# Patient Record
Sex: MALE | Race: WHITE | Employment: OTHER | URBAN - METROPOLITAN AREA
[De-identification: names, ages, dates, MRNs, and addresses within clinical notes are randomized per-mention and may not be internally consistent; named-entity substitution may affect disease eponyms.]

---

## 2019-01-04 ENCOUNTER — HOSPITAL ENCOUNTER (OUTPATIENT)
Dept: PREADMISSION TESTING | Age: 74
Discharge: HOME | End: 2019-01-04
Payer: MEDICARE

## 2019-01-04 LAB
ALBUMIN SERPL-MCNC: 3.7 G/DL (ref 3.4–5)
ALBUMIN/GLOB SERPL: 1.1 {RATIO} (ref 0.8–1.7)
ALP SERPL-CCNC: 68 U/L (ref 45–117)
ALT SERPL-CCNC: 67 U/L (ref 16–61)
ANION GAP SERPL CALC-SCNC: 5 MMOL/L (ref 3–18)
AST SERPL-CCNC: 43 U/L (ref 15–37)
BILIRUB SERPL-MCNC: 0.9 MG/DL (ref 0.2–1)
BUN SERPL-MCNC: 20 MG/DL (ref 7–18)
BUN/CREAT SERPL: 21
CALCIUM SERPL-MCNC: 9.2 MG/DL (ref 8.5–10.1)
CHLORIDE SERPL-SCNC: 102 MMOL/L (ref 100–108)
CO2 SERPL-SCNC: 30 MMOL/L (ref 21–32)
CREAT SERPL-MCNC: 0.95 MG/DL (ref 0.6–1.3)
GLOBULIN SER CALC-MCNC: 3.3 G/DL (ref 2–4)
GLUCOSE SERPL-MCNC: 100 MG/DL (ref 74–99)
POTASSIUM SERPL-SCNC: 5 MMOL/L (ref 3.5–5.5)
PROT SERPL-MCNC: 7 G/DL (ref 6.4–8.2)
SODIUM SERPL-SCNC: 137 MMOL/L (ref 136–145)

## 2019-01-04 PROCEDURE — 86900 BLOOD TYPING SEROLOGIC ABO: CPT

## 2019-01-04 PROCEDURE — 36415 COLL VENOUS BLD VENIPUNCTURE: CPT

## 2019-01-04 PROCEDURE — 80053 COMPREHEN METABOLIC PANEL: CPT

## 2019-01-05 LAB
ABO + RH BLD: NORMAL
BLOOD GROUP ANTIBODIES SERPL: NORMAL
SPECIMEN EXP DATE BLD: NORMAL

## 2019-01-11 ENCOUNTER — HOSPITAL ENCOUNTER (OUTPATIENT)
Age: 74
Discharge: HOME | End: 2019-01-11
Attending: ORTHOPAEDIC SURGERY | Admitting: ORTHOPAEDIC SURGERY
Payer: MEDICARE

## 2019-01-11 VITALS
WEIGHT: 197.06 LBS | TEMPERATURE: 98 F | HEART RATE: 63 BPM | DIASTOLIC BLOOD PRESSURE: 112 MMHG | OXYGEN SATURATION: 100 % | HEIGHT: 68 IN | RESPIRATION RATE: 16 BRPM | BODY MASS INDEX: 29.86 KG/M2 | SYSTOLIC BLOOD PRESSURE: 167 MMHG

## 2019-01-11 PROBLEM — Z96.641 STATUS POST TOTAL REPLACEMENT OF RIGHT HIP: Status: ACTIVE | Noted: 2019-01-11

## 2019-01-11 PROCEDURE — 74011250636 HC RX REV CODE- 250/636: Performed by: ORTHOPAEDIC SURGERY

## 2019-01-11 PROCEDURE — 74011250636 HC RX REV CODE- 250/636: Performed by: SPECIALIST

## 2019-01-11 RX ORDER — MAGNESIUM SULFATE 100 %
4 CRYSTALS MISCELLANEOUS AS NEEDED
Status: CANCELLED | OUTPATIENT
Start: 2019-01-11

## 2019-01-11 RX ORDER — DEXAMETHASONE SODIUM PHOSPHATE 4 MG/ML
8 INJECTION, SOLUTION INTRA-ARTICULAR; INTRALESIONAL; INTRAMUSCULAR; INTRAVENOUS; SOFT TISSUE ONCE
Status: DISCONTINUED | OUTPATIENT
Start: 2019-01-11 | End: 2019-01-11 | Stop reason: HOSPADM

## 2019-01-11 RX ORDER — HYDROMORPHONE HYDROCHLORIDE 2 MG/ML
0.5 INJECTION, SOLUTION INTRAMUSCULAR; INTRAVENOUS; SUBCUTANEOUS
Status: CANCELLED | OUTPATIENT
Start: 2019-01-11

## 2019-01-11 RX ORDER — SODIUM CHLORIDE, SODIUM LACTATE, POTASSIUM CHLORIDE, CALCIUM CHLORIDE 600; 310; 30; 20 MG/100ML; MG/100ML; MG/100ML; MG/100ML
125 INJECTION, SOLUTION INTRAVENOUS CONTINUOUS
Status: DISCONTINUED | OUTPATIENT
Start: 2019-01-11 | End: 2019-01-11 | Stop reason: HOSPADM

## 2019-01-11 RX ORDER — HYDROMORPHONE HYDROCHLORIDE 2 MG/ML
0.2 INJECTION, SOLUTION INTRAMUSCULAR; INTRAVENOUS; SUBCUTANEOUS
Status: CANCELLED | OUTPATIENT
Start: 2019-01-11

## 2019-01-11 RX ORDER — SODIUM CHLORIDE 0.9 % (FLUSH) 0.9 %
5-40 SYRINGE (ML) INJECTION EVERY 8 HOURS
Status: CANCELLED | OUTPATIENT
Start: 2019-01-11

## 2019-01-11 RX ORDER — NALOXONE HYDROCHLORIDE 0.4 MG/ML
0.1 INJECTION, SOLUTION INTRAMUSCULAR; INTRAVENOUS; SUBCUTANEOUS AS NEEDED
Status: CANCELLED | OUTPATIENT
Start: 2019-01-11

## 2019-01-11 RX ORDER — MORPHINE SULFATE 4 MG/ML
2 INJECTION, SOLUTION INTRAMUSCULAR; INTRAVENOUS
Status: CANCELLED | OUTPATIENT
Start: 2019-01-11

## 2019-01-11 RX ORDER — IBUPROFEN 200 MG
800 TABLET ORAL
COMMUNITY
End: 2019-01-27

## 2019-01-11 RX ORDER — FENTANYL CITRATE 50 UG/ML
50 INJECTION, SOLUTION INTRAMUSCULAR; INTRAVENOUS
Status: CANCELLED | OUTPATIENT
Start: 2019-01-11

## 2019-01-11 RX ORDER — SODIUM CHLORIDE 9 MG/ML
125 INJECTION, SOLUTION INTRAVENOUS CONTINUOUS
Status: CANCELLED | OUTPATIENT
Start: 2019-01-11

## 2019-01-11 RX ORDER — ONDANSETRON 2 MG/ML
4 INJECTION INTRAMUSCULAR; INTRAVENOUS ONCE
Status: CANCELLED | OUTPATIENT
Start: 2019-01-11 | End: 2019-01-11

## 2019-01-11 RX ORDER — CELECOXIB 100 MG/1
200 CAPSULE ORAL ONCE
Status: DISCONTINUED | OUTPATIENT
Start: 2019-01-11 | End: 2019-01-11 | Stop reason: HOSPADM

## 2019-01-11 RX ORDER — DEXTROSE 50 % IN WATER (D50W) INTRAVENOUS SYRINGE
25-50 AS NEEDED
Status: CANCELLED | OUTPATIENT
Start: 2019-01-11

## 2019-01-11 RX ORDER — FENTANYL CITRATE 50 UG/ML
25 INJECTION, SOLUTION INTRAMUSCULAR; INTRAVENOUS AS NEEDED
Status: CANCELLED | OUTPATIENT
Start: 2019-01-11

## 2019-01-11 RX ORDER — CEFAZOLIN SODIUM 2 G/50ML
2 SOLUTION INTRAVENOUS ONCE
Status: DISCONTINUED | OUTPATIENT
Start: 2019-01-11 | End: 2019-01-11 | Stop reason: HOSPADM

## 2019-01-11 RX ORDER — PREGABALIN 25 MG/1
25 CAPSULE ORAL ONCE
Status: DISCONTINUED | OUTPATIENT
Start: 2019-01-11 | End: 2019-01-11 | Stop reason: HOSPADM

## 2019-01-11 RX ORDER — SODIUM CHLORIDE 0.9 % (FLUSH) 0.9 %
5-40 SYRINGE (ML) INJECTION AS NEEDED
Status: CANCELLED | OUTPATIENT
Start: 2019-01-11

## 2019-01-11 RX ORDER — ACETAMINOPHEN 500 MG
1000 TABLET ORAL ONCE
Status: DISCONTINUED | OUTPATIENT
Start: 2019-01-11 | End: 2019-01-11 | Stop reason: HOSPADM

## 2019-01-11 RX ORDER — MORPHINE SULFATE 4 MG/ML
1 INJECTION, SOLUTION INTRAMUSCULAR; INTRAVENOUS
Status: CANCELLED | OUTPATIENT
Start: 2019-01-11

## 2019-01-11 RX ADMIN — SODIUM CHLORIDE, SODIUM LACTATE, POTASSIUM CHLORIDE, AND CALCIUM CHLORIDE 125 ML/HR: 600; 310; 30; 20 INJECTION, SOLUTION INTRAVENOUS at 06:34

## 2019-01-11 RX ADMIN — SODIUM CHLORIDE, SODIUM LACTATE, POTASSIUM CHLORIDE, AND CALCIUM CHLORIDE 1000 ML: 600; 310; 30; 20 INJECTION, SOLUTION INTRAVENOUS at 06:34

## 2019-01-11 NOTE — PERIOP NOTES
Dr. Royal Navas and Dr. Lidia Mahan made aware of last dose of Valsartan. Procedure cancelled d/t pt. Not taking BP meds for 5 days.

## 2019-01-16 NOTE — DISCHARGE SUMMARY
Case cancelled due to pt's elevated BP and pt noncompliance with medication instructions. He stopped his BP meds 1 week pre-op. Pt not admitted. Surgery will be rescheduled.

## 2019-01-24 ENCOUNTER — ANESTHESIA EVENT (OUTPATIENT)
Dept: SURGERY | Age: 74
DRG: 470 | End: 2019-01-24
Payer: MEDICARE

## 2019-01-24 RX ORDER — SODIUM CHLORIDE 0.9 % (FLUSH) 0.9 %
5-40 SYRINGE (ML) INJECTION EVERY 8 HOURS
Status: CANCELLED | OUTPATIENT
Start: 2019-01-25

## 2019-01-24 RX ORDER — SODIUM CHLORIDE 0.9 % (FLUSH) 0.9 %
5-40 SYRINGE (ML) INJECTION AS NEEDED
Status: CANCELLED | OUTPATIENT
Start: 2019-01-24

## 2019-01-25 ENCOUNTER — APPOINTMENT (OUTPATIENT)
Dept: GENERAL RADIOLOGY | Age: 74
DRG: 470 | End: 2019-01-25
Attending: ORTHOPAEDIC SURGERY
Payer: MEDICARE

## 2019-01-25 ENCOUNTER — HOSPITAL ENCOUNTER (INPATIENT)
Age: 74
LOS: 2 days | Discharge: HOME HEALTH | DRG: 470 | End: 2019-01-27
Attending: ORTHOPAEDIC SURGERY | Admitting: ORTHOPAEDIC SURGERY
Payer: MEDICARE

## 2019-01-25 ENCOUNTER — ANESTHESIA (OUTPATIENT)
Dept: SURGERY | Age: 74
DRG: 470 | End: 2019-01-25
Payer: MEDICARE

## 2019-01-25 DIAGNOSIS — Z96.641 STATUS POST TOTAL REPLACEMENT OF RIGHT HIP: Primary | ICD-10-CM

## 2019-01-25 PROCEDURE — 77030011256 HC DRSG MEPILEX <16IN NO BORD MOLN -A: Performed by: ORTHOPAEDIC SURGERY

## 2019-01-25 PROCEDURE — 74011000258 HC RX REV CODE- 258: Performed by: ORTHOPAEDIC SURGERY

## 2019-01-25 PROCEDURE — 0SR903A REPLACEMENT OF RIGHT HIP JOINT WITH CERAMIC SYNTHETIC SUBSTITUTE, UNCEMENTED, OPEN APPROACH: ICD-10-PCS | Performed by: ORTHOPAEDIC SURGERY

## 2019-01-25 PROCEDURE — 77030014144 HC TY SPN ANES BBMI -B: Performed by: ANESTHESIOLOGY

## 2019-01-25 PROCEDURE — 77030012890

## 2019-01-25 PROCEDURE — 76210000017 HC OR PH I REC 1.5 TO 2 HR: Performed by: ORTHOPAEDIC SURGERY

## 2019-01-25 PROCEDURE — 77030037875 HC DRSG MEPILEX <16IN BORD MOLN -A: Performed by: ORTHOPAEDIC SURGERY

## 2019-01-25 PROCEDURE — 77030032490 HC SLV COMPR SCD KNE COVD -B: Performed by: ORTHOPAEDIC SURGERY

## 2019-01-25 PROCEDURE — 77030020262 HC SOL INJ SOD CL 0.9% 100ML: Performed by: ORTHOPAEDIC SURGERY

## 2019-01-25 PROCEDURE — 65270000029 HC RM PRIVATE

## 2019-01-25 PROCEDURE — 74011250636 HC RX REV CODE- 250/636

## 2019-01-25 PROCEDURE — 77030012406 HC DRN WND PENRS BARD -A: Performed by: ORTHOPAEDIC SURGERY

## 2019-01-25 PROCEDURE — 73501 X-RAY EXAM HIP UNI 1 VIEW: CPT

## 2019-01-25 PROCEDURE — C1776 JOINT DEVICE (IMPLANTABLE): HCPCS | Performed by: ORTHOPAEDIC SURGERY

## 2019-01-25 PROCEDURE — 77030018836 HC SOL IRR NACL ICUM -A: Performed by: ORTHOPAEDIC SURGERY

## 2019-01-25 PROCEDURE — 74011000250 HC RX REV CODE- 250: Performed by: ORTHOPAEDIC SURGERY

## 2019-01-25 PROCEDURE — 76010000131 HC OR TIME 2 TO 2.5 HR: Performed by: ORTHOPAEDIC SURGERY

## 2019-01-25 PROCEDURE — 77030031139 HC SUT VCRL2 J&J -A: Performed by: ORTHOPAEDIC SURGERY

## 2019-01-25 PROCEDURE — 74011250637 HC RX REV CODE- 250/637: Performed by: ORTHOPAEDIC SURGERY

## 2019-01-25 PROCEDURE — 77030038010: Performed by: ORTHOPAEDIC SURGERY

## 2019-01-25 PROCEDURE — 77030022295 HC SEAL BPLR VSL DISP MEDT -F: Performed by: ORTHOPAEDIC SURGERY

## 2019-01-25 PROCEDURE — 74011250636 HC RX REV CODE- 250/636: Performed by: ORTHOPAEDIC SURGERY

## 2019-01-25 PROCEDURE — 77030002933 HC SUT MCRYL J&J -A: Performed by: ORTHOPAEDIC SURGERY

## 2019-01-25 PROCEDURE — 76060000035 HC ANESTHESIA 2 TO 2.5 HR: Performed by: ORTHOPAEDIC SURGERY

## 2019-01-25 PROCEDURE — 77030027138 HC INCENT SPIROMETER -A: Performed by: ORTHOPAEDIC SURGERY

## 2019-01-25 PROCEDURE — C9290 INJ, BUPIVACAINE LIPOSOME: HCPCS | Performed by: ORTHOPAEDIC SURGERY

## 2019-01-25 PROCEDURE — 74011000250 HC RX REV CODE- 250

## 2019-01-25 PROCEDURE — 77030020782 HC GWN BAIR PAWS FLX 3M -B: Performed by: ORTHOPAEDIC SURGERY

## 2019-01-25 PROCEDURE — 77030002912 HC SUT ETHBND J&J -A: Performed by: ORTHOPAEDIC SURGERY

## 2019-01-25 DEVICE — LINER ACET OD58MM ID36MM HIP ALTRX PINN: Type: IMPLANTABLE DEVICE | Site: HIP | Status: FUNCTIONAL

## 2019-01-25 DEVICE — COMPONENT TOT HIP PRIMARY CERM ALTRX: Type: IMPLANTABLE DEVICE | Site: HIP | Status: FUNCTIONAL

## 2019-01-25 DEVICE — STEM FEM SZ 5 L105MM 12/14 TAPR HI OFFSET HIP DUOFIX CLLRD: Type: IMPLANTABLE DEVICE | Site: HIP | Status: FUNCTIONAL

## 2019-01-25 DEVICE — SCREW BNE L25MM DIA6.5MM CANC HIP S STL GRIPTION FULL THRD: Type: IMPLANTABLE DEVICE | Site: HIP | Status: FUNCTIONAL

## 2019-01-25 DEVICE — CUP ACET DIA58MM HIP GRIPTION PRI CEMENTLESS FIX SECT SER: Type: IMPLANTABLE DEVICE | Site: HIP | Status: FUNCTIONAL

## 2019-01-25 DEVICE — BIOLOX DELTA CERAMIC FEMORAL HEAD +5.0 36MM DIA 12/14 TAPER
Type: IMPLANTABLE DEVICE | Site: HIP | Status: FUNCTIONAL
Brand: BIOLOX DELTA

## 2019-01-25 DEVICE — ELIMINATOR H APEX FOR 48-60MM PINN HIP SHELL: Type: IMPLANTABLE DEVICE | Site: HIP | Status: FUNCTIONAL

## 2019-01-25 RX ORDER — PROPOFOL 10 MG/ML
INJECTION, EMULSION INTRAVENOUS AS NEEDED
Status: DISCONTINUED | OUTPATIENT
Start: 2019-01-25 | End: 2019-01-25 | Stop reason: HOSPADM

## 2019-01-25 RX ORDER — LORAZEPAM 0.5 MG/1
0.5 TABLET ORAL EVERY 8 HOURS
Status: DISCONTINUED | OUTPATIENT
Start: 2019-01-25 | End: 2019-01-27 | Stop reason: HOSPADM

## 2019-01-25 RX ORDER — DIPHENHYDRAMINE HCL 25 MG
25 CAPSULE ORAL
Status: DISCONTINUED | OUTPATIENT
Start: 2019-01-25 | End: 2019-01-27 | Stop reason: HOSPADM

## 2019-01-25 RX ORDER — SODIUM CHLORIDE, SODIUM LACTATE, POTASSIUM CHLORIDE, CALCIUM CHLORIDE 600; 310; 30; 20 MG/100ML; MG/100ML; MG/100ML; MG/100ML
125 INJECTION, SOLUTION INTRAVENOUS CONTINUOUS
Status: DISCONTINUED | OUTPATIENT
Start: 2019-01-25 | End: 2019-01-27 | Stop reason: HOSPADM

## 2019-01-25 RX ORDER — SODIUM CHLORIDE 0.9 % (FLUSH) 0.9 %
5-40 SYRINGE (ML) INJECTION AS NEEDED
Status: DISCONTINUED | OUTPATIENT
Start: 2019-01-25 | End: 2019-01-27 | Stop reason: HOSPADM

## 2019-01-25 RX ORDER — PROPOFOL 10 MG/ML
INJECTION, EMULSION INTRAVENOUS
Status: DISCONTINUED | OUTPATIENT
Start: 2019-01-25 | End: 2019-01-25 | Stop reason: HOSPADM

## 2019-01-25 RX ORDER — KETOROLAC TROMETHAMINE 15 MG/ML
15 INJECTION, SOLUTION INTRAMUSCULAR; INTRAVENOUS EVERY 6 HOURS
Status: COMPLETED | OUTPATIENT
Start: 2019-01-25 | End: 2019-01-25

## 2019-01-25 RX ORDER — LORAZEPAM 0.5 MG/1
0.5 TABLET ORAL
Status: DISCONTINUED | OUTPATIENT
Start: 2019-01-25 | End: 2019-01-27 | Stop reason: HOSPADM

## 2019-01-25 RX ORDER — ONDANSETRON 2 MG/ML
INJECTION INTRAMUSCULAR; INTRAVENOUS AS NEEDED
Status: DISCONTINUED | OUTPATIENT
Start: 2019-01-25 | End: 2019-01-25 | Stop reason: HOSPADM

## 2019-01-25 RX ORDER — ONDANSETRON 2 MG/ML
4 INJECTION INTRAMUSCULAR; INTRAVENOUS
Status: DISCONTINUED | OUTPATIENT
Start: 2019-01-25 | End: 2019-01-27 | Stop reason: HOSPADM

## 2019-01-25 RX ORDER — NALOXONE HYDROCHLORIDE 0.4 MG/ML
0.4 INJECTION, SOLUTION INTRAMUSCULAR; INTRAVENOUS; SUBCUTANEOUS AS NEEDED
Status: DISCONTINUED | OUTPATIENT
Start: 2019-01-25 | End: 2019-01-27 | Stop reason: HOSPADM

## 2019-01-25 RX ORDER — ACETAMINOPHEN 500 MG
1000 TABLET ORAL EVERY 8 HOURS
Status: DISCONTINUED | OUTPATIENT
Start: 2019-01-25 | End: 2019-01-27 | Stop reason: HOSPADM

## 2019-01-25 RX ORDER — OXYCODONE HYDROCHLORIDE 5 MG/1
10 TABLET ORAL
Status: DISCONTINUED | OUTPATIENT
Start: 2019-01-25 | End: 2019-01-27 | Stop reason: HOSPADM

## 2019-01-25 RX ORDER — SODIUM CHLORIDE, SODIUM LACTATE, POTASSIUM CHLORIDE, CALCIUM CHLORIDE 600; 310; 30; 20 MG/100ML; MG/100ML; MG/100ML; MG/100ML
125 INJECTION, SOLUTION INTRAVENOUS CONTINUOUS
Status: DISPENSED | OUTPATIENT
Start: 2019-01-25 | End: 2019-01-26

## 2019-01-25 RX ORDER — LANOLIN ALCOHOL/MO/W.PET/CERES
1 CREAM (GRAM) TOPICAL
Status: DISCONTINUED | OUTPATIENT
Start: 2019-01-26 | End: 2019-01-27 | Stop reason: HOSPADM

## 2019-01-25 RX ORDER — MIDAZOLAM HYDROCHLORIDE 1 MG/ML
INJECTION, SOLUTION INTRAMUSCULAR; INTRAVENOUS AS NEEDED
Status: DISCONTINUED | OUTPATIENT
Start: 2019-01-25 | End: 2019-01-25 | Stop reason: HOSPADM

## 2019-01-25 RX ORDER — FLUTICASONE PROPIONATE 50 MCG
2 SPRAY, SUSPENSION (ML) NASAL DAILY
Status: DISCONTINUED | OUTPATIENT
Start: 2019-01-26 | End: 2019-01-27 | Stop reason: HOSPADM

## 2019-01-25 RX ORDER — EPHEDRINE SULFATE 50 MG/ML
INJECTION, SOLUTION INTRAVENOUS AS NEEDED
Status: DISCONTINUED | OUTPATIENT
Start: 2019-01-25 | End: 2019-01-25 | Stop reason: HOSPADM

## 2019-01-25 RX ORDER — OXYCODONE HYDROCHLORIDE 5 MG/1
5 TABLET ORAL
Status: DISCONTINUED | OUTPATIENT
Start: 2019-01-25 | End: 2019-01-27 | Stop reason: HOSPADM

## 2019-01-25 RX ORDER — AMOXICILLIN 250 MG
1 CAPSULE ORAL 2 TIMES DAILY
Status: DISCONTINUED | OUTPATIENT
Start: 2019-01-25 | End: 2019-01-27 | Stop reason: HOSPADM

## 2019-01-25 RX ORDER — FLUTICASONE PROPIONATE 50 MCG
2 SPRAY, SUSPENSION (ML) NASAL DAILY
COMMUNITY
End: 2019-10-08

## 2019-01-25 RX ORDER — LIDOCAINE HYDROCHLORIDE 10 MG/ML
INJECTION INFILTRATION; PERINEURAL AS NEEDED
Status: DISCONTINUED | OUTPATIENT
Start: 2019-01-25 | End: 2019-01-25 | Stop reason: HOSPADM

## 2019-01-25 RX ORDER — CEFAZOLIN SODIUM 2 G/50ML
2 SOLUTION INTRAVENOUS EVERY 8 HOURS
Status: COMPLETED | OUTPATIENT
Start: 2019-01-25 | End: 2019-01-27

## 2019-01-25 RX ORDER — SODIUM CHLORIDE 0.9 % (FLUSH) 0.9 %
5-40 SYRINGE (ML) INJECTION EVERY 8 HOURS
Status: DISCONTINUED | OUTPATIENT
Start: 2019-01-25 | End: 2019-01-27 | Stop reason: HOSPADM

## 2019-01-25 RX ORDER — BUPIVACAINE HYDROCHLORIDE 7.5 MG/ML
INJECTION, SOLUTION INTRASPINAL AS NEEDED
Status: DISCONTINUED | OUTPATIENT
Start: 2019-01-25 | End: 2019-01-25 | Stop reason: HOSPADM

## 2019-01-25 RX ORDER — LIDOCAINE HYDROCHLORIDE 20 MG/ML
INJECTION, SOLUTION EPIDURAL; INFILTRATION; INTRACAUDAL; PERINEURAL AS NEEDED
Status: DISCONTINUED | OUTPATIENT
Start: 2019-01-25 | End: 2019-01-25 | Stop reason: HOSPADM

## 2019-01-25 RX ORDER — POLYETHYLENE GLYCOL 3350 17 G/17G
17 POWDER, FOR SOLUTION ORAL DAILY
Status: DISCONTINUED | OUTPATIENT
Start: 2019-01-26 | End: 2019-01-27 | Stop reason: HOSPADM

## 2019-01-25 RX ORDER — CEFAZOLIN SODIUM 2 G/50ML
2 SOLUTION INTRAVENOUS ONCE
Status: COMPLETED | OUTPATIENT
Start: 2019-01-25 | End: 2019-01-25

## 2019-01-25 RX ADMIN — KETOROLAC TROMETHAMINE 15 MG: 15 INJECTION, SOLUTION INTRAMUSCULAR; INTRAVENOUS at 19:02

## 2019-01-25 RX ADMIN — SODIUM CHLORIDE, SODIUM LACTATE, POTASSIUM CHLORIDE, AND CALCIUM CHLORIDE 125 ML/HR: 600; 310; 30; 20 INJECTION, SOLUTION INTRAVENOUS at 19:01

## 2019-01-25 RX ADMIN — STANDARDIZED SENNA CONCENTRATE AND DOCUSATE SODIUM 1 TABLET: 8.6; 5 TABLET, FILM COATED ORAL at 21:26

## 2019-01-25 RX ADMIN — CEFAZOLIN SODIUM 2 G: 2 SOLUTION INTRAVENOUS at 14:00

## 2019-01-25 RX ADMIN — OXYCODONE HYDROCHLORIDE 10 MG: 5 TABLET ORAL at 21:26

## 2019-01-25 RX ADMIN — SODIUM CHLORIDE, SODIUM LACTATE, POTASSIUM CHLORIDE, AND CALCIUM CHLORIDE: 600; 310; 30; 20 INJECTION, SOLUTION INTRAVENOUS at 15:20

## 2019-01-25 RX ADMIN — SODIUM CHLORIDE, SODIUM LACTATE, POTASSIUM CHLORIDE, AND CALCIUM CHLORIDE 125 ML/HR: 600; 310; 30; 20 INJECTION, SOLUTION INTRAVENOUS at 16:16

## 2019-01-25 RX ADMIN — PROPOFOL 50 MCG/KG/MIN: 10 INJECTION, EMULSION INTRAVENOUS at 14:10

## 2019-01-25 RX ADMIN — ACETAMINOPHEN 1000 MG: 500 TABLET, FILM COATED ORAL at 19:02

## 2019-01-25 RX ADMIN — TRANEXAMIC ACID 1 G: 100 INJECTION, SOLUTION INTRAVENOUS at 14:07

## 2019-01-25 RX ADMIN — ONDANSETRON 4 MG: 2 INJECTION INTRAMUSCULAR; INTRAVENOUS at 14:55

## 2019-01-25 RX ADMIN — SODIUM CHLORIDE, SODIUM LACTATE, POTASSIUM CHLORIDE, AND CALCIUM CHLORIDE 125 ML/HR: 600; 310; 30; 20 INJECTION, SOLUTION INTRAVENOUS at 18:04

## 2019-01-25 RX ADMIN — LIDOCAINE HYDROCHLORIDE 60 MG: 20 INJECTION, SOLUTION EPIDURAL; INFILTRATION; INTRACAUDAL; PERINEURAL at 14:10

## 2019-01-25 RX ADMIN — MIDAZOLAM HYDROCHLORIDE 2 MG: 1 INJECTION, SOLUTION INTRAMUSCULAR; INTRAVENOUS at 13:53

## 2019-01-25 RX ADMIN — SODIUM CHLORIDE, SODIUM LACTATE, POTASSIUM CHLORIDE, AND CALCIUM CHLORIDE 125 ML/HR: 600; 310; 30; 20 INJECTION, SOLUTION INTRAVENOUS at 12:32

## 2019-01-25 RX ADMIN — LIDOCAINE HYDROCHLORIDE 3 ML: 10 INJECTION INFILTRATION; PERINEURAL at 13:55

## 2019-01-25 RX ADMIN — PROPOFOL 30 MG: 10 INJECTION, EMULSION INTRAVENOUS at 14:10

## 2019-01-25 RX ADMIN — BUPIVACAINE HYDROCHLORIDE 2 ML: 7.5 INJECTION, SOLUTION INTRASPINAL at 13:58

## 2019-01-25 RX ADMIN — EPHEDRINE SULFATE 10 MG: 50 INJECTION, SOLUTION INTRAVENOUS at 16:14

## 2019-01-25 RX ADMIN — CEFAZOLIN SODIUM 2 G: 2 SOLUTION INTRAVENOUS at 21:26

## 2019-01-25 NOTE — PERIOP NOTES
TRANSFER - OUT REPORT: 
 
Verbal report given to JAZMÍN Posada RN(name) on Keren Garcia  being transferred to  (unit) for routine post - op Report consisted of patients Situation, Background, Assessment and  
Recommendations(SBAR). Information from the following report(s) SBAR, Kardex, OR Summary, Intake/Output and MAR was reviewed with the receiving nurse. Lines:  
Peripheral IV 01/25/19 Left Hand (Active) Site Assessment Clean, dry, & intact 1/25/2019  5:00 PM  
Phlebitis Assessment 0 1/25/2019  5:00 PM  
Infiltration Assessment 0 1/25/2019  5:00 PM  
Dressing Status Clean, dry, & intact 1/25/2019  5:00 PM  
Dressing Type Transparent 1/25/2019  5:00 PM  
Hub Color/Line Status Infusing 1/25/2019  5:00 PM  
  
 
Opportunity for questions and clarification was provided. Patient transported with: 
 O2 @ 2 liters Registered Nurse

## 2019-01-25 NOTE — OP NOTES
[address] [place] [address], [phone]     RIGHT TOTAL HIP REPLACEMENT      Patient: Eddie Prabhakar MRN: [id number]  SSN: [id number]    YOB: 1945  Age: 74 y.o. Sex: male      Date of Surgery: 1/25/2019  Incision Time: 1416  Antibiotic Infusion Time: 1400  Preoperative Diagnosis: RIGHT HIP OSTEOARTHRITIS   Postoperative Diagnosis: RIGHT HIP OSTEOARTHRITIS   [facility]: [facility]  Surgeon: Emy Cortes MD  Assistant: Circ-1: Juan Mcwilliams RN  Circ-Relief: Marcelo Ores  Scrub Tech-1: Shirin Oppenheim  Scrub Tech-2: Aura Vazquez  Scrub Tech-Relief: Stacey Guerra  Scrub RN-2: Kacie Madsen RN  Surg Asst-Relief: Dari Harris    Anesthesia: general    Procedure: Total Right Hip Arthroplasty  (CPT: 59206)    Findings: Degenerative joint disease of the right hip. Estimated Blood Loss: 300 mL    Specimens: None    Complications: None    Implants:   Implant Name Type Inv.  Item Serial No.  Lot No. LRB No. Used Action   LINER ACET PINN NEUT [id number] -- ALTRX - [id number]  LINER ACET PINN NEUT [id number] -- ALTRX  [facility] ORTHOPEDICS [id number] Right 1 Implanted   CUP ACET SECTOR GRIPTION 58MM -- TI - [id number]  CUP ACET SECTOR GRIPTION 58MM -- TI  [facility] ORTHOPEDICS [id number] Right 1 Implanted   PLUG ACET APCL H ELIM POS STP --  - [id number]  PLUG ACET APCL H ELIM POS STP --   [facility] ORTHOPEDICS [id number] Right 1 Implanted   SCR ACET CANC PINN 6.5X25MM SS --  - [id number]  SCR ACET CANC PINN 6.5X25MM SS --   [facility] ORTHOPEDICS [id number] Right 1 Implanted   SCR ACET CANC PINN 6.5X25MM SS --  - [id number]  SCR ACET CANC PINN 6.5X25MM SS --   [facility] ORTHOPEDICS [id number] Right 1 Implanted   STEM FEM TAPR SZ5 HI OFFSET -- ACTIS - [id number]  STEM FEM TAPR SZ5 HI OFFSET -- ACTIS  [facility] DEP ORTHOPEDICS [id number] Right 1 Implanted   HEAD FEM S-ROM 36MM +5MM NK -- BIOLOX DELTA - [id number]  HEAD FEM S-ROM 36MM +5MM NK -- BIOLOX DELTA  [facility] DEPUY ORTHOPEDICS [id number] Right 1 Implanted       Procedure Detail:  After the patient was brought to the operating suite, He was effectively anesthetized using SPINAL anesthesia, then transferred to the [place] table and secured in a standard fashion. His right hip was then prepped and draped in a normal sterile orthopedic fashion. He was given appropriate intravenous antibiotics preoperatively. After a proper timeout was performed, a direct anterior approach to the hip was performed using a short Caldwell-Juarez interval. Anterior capsulotomy was performed. The degenerative changes of the hip were noted. Femoral neck osteotomy was then performed to the templated area. The head and neck were removed. The pulvinar and labrum were excised. The acetabulum was then reamed up to 57 mm with good bleeding cancellous bone obtained. The cup was then irrigated. A 58 mm Gription cup was then impacted in place with excellent stable fixation obtained, placing the cup at about 45 degrees of abduction, 20 degrees of anteversion. 2 screws were placed superiorly. The liner was then impacted in place. Attention was turned to the femur, which was delivered into the wound with a combination of extension, external rotation, and adduction, and using the hook on the [place] table to deliver the femur into the wound. The canal was broached up to a size 6 for the ACTIS stem system with excellent stable fixation obtained. A trial reduction was then performed with the HIGH neck offset and 36 mm head balls with various neck lengths. With the +5, he appeared to have equalization of leg lengths and restoration of offset radiographically, and excellent functional stability was noted. The trial broach was removed. The canal was irrigated. The final components were impacted in place with excellent stable fixation obtained once again.  The final reduction was performed and once again leg lengths and offset were restored radiographically, using the C-arm radiographically intraoperatively, and excellent functional stability was noted. 30 cc of 0.25% marcaine and 20 cc of exparel diluted with 40 cc of NS were seperately injected into the soft tissues. The wound was then irrigated one more time, and then closed in layers. The capsule was closed with 1 Ethibond. The fascia of the tensor was closed with #1 Vicryl in a running type stitch. Subcutaneous tissue was closed with 0 vicryl then the subcuticular layer closed with 3-0 Vicryl in a simple buried stitch, and the skin was closed with running subcuticular 3-0 Monocryl. Prineo was applied followed by a dry, sterile dressing. He tolerated this well, was transferred to the bed, and taken to recovery room in stable condition. All sponge and needle counts were correct.     Signed By: Anyi Sarmiento MD     January 25, 2019

## 2019-01-25 NOTE — ANESTHESIA POSTPROCEDURE EVALUATION
Procedure(s): RIGHT TOTAL HIP ARTHROPLASTY ANTERIOR APPROACH WITH C-ARM, \"SPEC POP\". Anesthesia Post Evaluation Multimodal analgesia: multimodal analgesia used between 6 hours prior to anesthesia start to PACU discharge Patient location during evaluation: PACU Patient participation: complete - patient participated Level of consciousness: awake Pain management: adequate Airway patency: patent Anesthetic complications: no 
Cardiovascular status: acceptable Respiratory status: acceptable Hydration status: acceptable Post anesthesia nausea and vomiting:  none Visit Vitals /73 Pulse 75 Temp 37 °C (98.6 °F) Resp 17 Ht 5' 8\" (1.727 m) Wt 92.7 kg (204 lb 6 oz) SpO2 100% BMI 31.08 kg/m²

## 2019-01-25 NOTE — H&P
H&P Note Assessment:  
74 y.o. male admitted on 1/25/2019 for RIGHT HIP OSTEOARTHRITIS Plan:  
 
-Analgesia 
-to OR for RIGHT RAYSA Subjective: Zhen Winston has severe RIGHT HIP pain. He  is a 74 y.o. male admitted on 1/25/2019 for RIGHT HIP OSTEOARTHRITIS. He has failed conservative Rx. No Known Allergies Past Medical History:  
Diagnosis Date  Allergic rhinitis  Arrhythmia   
 afib  Arthritis  Cancer [facility])   
 prostate  Concussion   
 no loc but lose of memory after that event for that day  Hypertension 2016 Past Surgical History:  
Procedure Laterality Date  HX HEENT    
 sinus opened  HX HERNIA REPAIR  age 13  
 right  HX ORTHOPAEDIC    
 cartilege removed from right knee  HX PROSTATECTOMY  2003  HX TONSILLECTOMY  HX VASECTOMY History reviewed. No pertinent family history. Review of Systems: 
General--Negative for fatigue, weight loss, anorexia Cardiovascular--Negative for CP, orthopnea, PND Endocrine--Negative for polyuria, polydipsia, cold intolerance Vitals:  
 01/25/19 1136 BP: 130/59 Pulse: 70 Resp: 16 SpO2: 100% Weight: 92.7 kg (204 lb 6 oz) Height: 5' 8\" (1.727 m) Physical Exam: General Appearance: Alert and Oriented x3. No acute distress. Conversant. Age-appropriate. Normal mood and affect. Normal inspiratory and expiratory effort. Musculoskeletal: Right Lower extremity: 
Skin: intact Motor intact knee flexion/extension, ankle plantarflexion and dorsiflexion, toes flex and extend. Sensory intact L4-S1 Posterior Tibial Pulse 2+, Dorsalis Pedis Pulse 2+, Capillary Refill <2 Compartments soft No significant edema Negative Jenniffer's Sign +FADIR at hip, +Stinchfield Radiographs:severe DJD right hip Leavr Negron MD

## 2019-01-25 NOTE — PROGRESS NOTES
Pt transferred to room 210. Pt stable. RN Nabil Breath at bedside. Dressing CDI.  
 
 
 01/25/19 1816 Vitals Temp 97.6 °F (36.4 °C) Temp Source Axillary Pulse (Heart Rate) 72 Resp Rate 18  
O2 Sat (%) 98 % Level of Consciousness Alert /74 MEWS Score 1

## 2019-01-25 NOTE — PERIOP NOTES
Reviewed PTA medication list with patient/caregiver and patient/caregiver denies any additional medications. Patient admits to having a responsible adult care for them for at least 24 hours after surgery. 
  
Permission granted by patient for a dual skin assessment. Dual skin assessment completed by Temo Bowens RN and Jed Martinez RN.

## 2019-01-25 NOTE — ROUTINE PROCESS
TRANSFER - IN REPORT: 
 
Verbal report received from [facility]) on Elodia Reddy  being received from [facility]) for routine post - op Report consisted of patients Situation, Background, Assessment and  
Recommendations(SBAR). Information from the following report(s) SBAR, OR Summary, Procedure Summary, MAR, Recent Results and Med Rec Status was reviewed with the receiving nurse. Opportunity for questions and clarification was provided. Assessment completed upon patients arrival to unit and care assumed.

## 2019-01-25 NOTE — ANESTHESIA PROCEDURE NOTES
Spinal Block Start time: 1/25/2019 1:53 PM 
End time: 1/25/2019 1:58 PM 
Performed by: Debi Chase, CRNA Authorized by: Sang Garsia MD  
 
Pre-procedure: Indications: primary anesthetic  Preanesthetic Checklist: patient identified, risks and benefits discussed, anesthesia consent, site marked, patient being monitored and timeout performed Timeout Time: 13:53 Spinal Block:  
Patient Position:  Seated Prep Region:  Lumbar Prep: chlorhexidine Location:  L3-4 Technique:  Single shot Needle:  
Needle Type: Katelin Needle Gauge:  22 G Attempts:  1 Events: CSF confirmed, no blood with aspiration and no paresthesia Assessment: 
Insertion:  Uncomplicated Patient tolerance:  Patient tolerated the procedure well with no immediate complications

## 2019-01-26 PROCEDURE — 74011250637 HC RX REV CODE- 250/637: Performed by: ORTHOPAEDIC SURGERY

## 2019-01-26 PROCEDURE — 97116 GAIT TRAINING THERAPY: CPT

## 2019-01-26 PROCEDURE — 97161 PT EVAL LOW COMPLEX 20 MIN: CPT

## 2019-01-26 PROCEDURE — 65270000029 HC RM PRIVATE

## 2019-01-26 PROCEDURE — 74011250636 HC RX REV CODE- 250/636: Performed by: ORTHOPAEDIC SURGERY

## 2019-01-26 PROCEDURE — 77030012890

## 2019-01-26 PROCEDURE — 97110 THERAPEUTIC EXERCISES: CPT

## 2019-01-26 RX ORDER — OXYCODONE AND ACETAMINOPHEN 5; 325 MG/1; MG/1
TABLET ORAL
Qty: 56 TAB | Refills: 0 | Status: SHIPPED | OUTPATIENT
Start: 2019-01-26 | End: 2019-01-30

## 2019-01-26 RX ADMIN — OXYCODONE HYDROCHLORIDE 5 MG: 5 TABLET ORAL at 18:55

## 2019-01-26 RX ADMIN — LORAZEPAM 0.5 MG: 0.5 TABLET ORAL at 13:55

## 2019-01-26 RX ADMIN — LORAZEPAM 0.5 MG: 0.5 TABLET ORAL at 06:42

## 2019-01-26 RX ADMIN — MULTIPLE VITAMINS W/ MINERALS TAB 1 TABLET: TAB at 09:12

## 2019-01-26 RX ADMIN — VALSARTAN: 160 TABLET, FILM COATED ORAL at 09:12

## 2019-01-26 RX ADMIN — OXYCODONE HYDROCHLORIDE 10 MG: 5 TABLET ORAL at 14:43

## 2019-01-26 RX ADMIN — OXYCODONE HYDROCHLORIDE 10 MG: 5 TABLET ORAL at 00:42

## 2019-01-26 RX ADMIN — SODIUM CHLORIDE, SODIUM LACTATE, POTASSIUM CHLORIDE, AND CALCIUM CHLORIDE 125 ML/HR: 600; 310; 30; 20 INJECTION, SOLUTION INTRAVENOUS at 00:42

## 2019-01-26 RX ADMIN — OXYCODONE HYDROCHLORIDE 10 MG: 5 TABLET ORAL at 09:13

## 2019-01-26 RX ADMIN — LORAZEPAM 0.5 MG: 0.5 TABLET ORAL at 22:02

## 2019-01-26 RX ADMIN — POLYETHYLENE GLYCOL 3350 17 G: 17 POWDER, FOR SOLUTION ORAL at 09:13

## 2019-01-26 RX ADMIN — APIXABAN 5 MG: 5 TABLET, FILM COATED ORAL at 03:43

## 2019-01-26 RX ADMIN — Medication 10 ML: at 22:03

## 2019-01-26 RX ADMIN — Medication 10 ML: at 13:56

## 2019-01-26 RX ADMIN — APIXABAN 5 MG: 5 TABLET, FILM COATED ORAL at 17:36

## 2019-01-26 RX ADMIN — FERROUS SULFATE TAB 325 MG (65 MG ELEMENTAL FE) 325 MG: 325 (65 FE) TAB at 09:13

## 2019-01-26 RX ADMIN — CEFAZOLIN SODIUM 2 G: 2 SOLUTION INTRAVENOUS at 06:35

## 2019-01-26 RX ADMIN — STANDARDIZED SENNA CONCENTRATE AND DOCUSATE SODIUM 1 TABLET: 8.6; 5 TABLET, FILM COATED ORAL at 22:02

## 2019-01-26 RX ADMIN — ACETAMINOPHEN 1000 MG: 500 TABLET, FILM COATED ORAL at 03:42

## 2019-01-26 RX ADMIN — STANDARDIZED SENNA CONCENTRATE AND DOCUSATE SODIUM 1 TABLET: 8.6; 5 TABLET, FILM COATED ORAL at 09:12

## 2019-01-26 RX ADMIN — ACETAMINOPHEN 1000 MG: 500 TABLET, FILM COATED ORAL at 12:07

## 2019-01-26 RX ADMIN — ACETAMINOPHEN 1000 MG: 500 TABLET, FILM COATED ORAL at 18:54

## 2019-01-26 NOTE — ROUTINE PROCESS
Bedside and verbal shift change report given to Jed Alvarado RN (oncoming nurse) by Sangeetha Hair RN (offgoing nurse). Report included the following information: SBAR, Kardex, MAR, and recent results.

## 2019-01-26 NOTE — PROGRESS NOTES
2015 Assumed pt care at this time. Report received from Pricila Giraldo RN. Pt rating pain 1/10. Pt in bed in lowest position with wheels locked. Call light within reach. Will continue to monitor. 2126 Pt rating pain 7/10. Administered 10 mg Roxicodone prn. Educated pt on side effects and provided a highlighted side effects sheet. Pt stated he was in too much pain to get out of bed at this time. 2130 Assessment complete. Pt is alert and oriented x 4. Denies SOB and chest pain. Pt lungs clear bilaterally. Capillary refill less than 3 seconds. Pt denies numbness and tingling in all extremities. Stated pain 7/10. Pt has 18 gauge IV to his left hand. Pt has a mepilex dressing. PLEXI's and TEDs applied to bilateral lower extremities. Pt encouraged to continue use of IS. Pt verbalized understanding. Ice pack applied. Call light and possessions within reach. Bed in lowest position. Will continue to monitor. 0042 Pt rating pain 7/10. Administered 10 mg Roxicodone prn. 
 
2680 Pt up in chair. Ice pack supplied, but pt does not like to use them. Pt expressing a great desire to go home today. Shift summary: Pt is alert and oriented x 4. Pt had an uneventful shift. Pt ambulating and voiding sufficient amounts. Pain controlled by PRN medication.

## 2019-01-26 NOTE — PROGRESS NOTES
Problem: Mobility Impaired (Adult and Pediatric) Goal: *Acute Goals and Plan of Care (Insert Text) Physical Therapy Goals Initiated 1/26/2019  to be met within 1-2 days STG's: 
1. Bed mobility:  Supine to/from sit with S in prep for ADL activity. 2. Transfers:  Sit to/from stand with S in prep for gait. 3. Tolerate sitting up in chair >30min for functional activity performance. LTG's 1. Ambulation:  Ambulate 150 ft.with S with LRAD for home mobility at discharge. 2. Patient Education:  S/Independent with HEP for home performance accurately at discharge. 3. Step Negotiation: Ascend/Descend 6 steps with CGA with HR for home navigation as indicated. Outcome: Progressing Towards Goal 
physical Therapy EVALUATION Patient: Edgardo Kennedy (6 y.o. male) Date: 1/26/2019 Primary Diagnosis: RIGHT HIP OSTEOARTHRITIS Status post total replacement of right hip Procedure(s) (LRB): 
RIGHT TOTAL HIP ARTHROPLASTY ANTERIOR APPROACH WITH C-ARM, \"SPEC POP\" (Right) 1 Day Post-Op Precautions:Fall, WBAT 
 
ASSESSMENT : 
Based on the objective data described below, the patient is a 73 yo M found sitting up in chair at bedside with spouse present in room. Pt presents with decreased ROM/motor performance R LE, decreased independence in overall functional mobility, and decreased gait quality following above surgery. Patient reports pain 0/10 pre and 2/10 post treatment. Pt required vc/CGA during transfers, and participated in GT/RW/CGA 110ft using reciprocal gt pattern with fluctuating jarrod. Verbal cues for safety and sequencing required as pt tends to push RW too far away from body. Pt left back in chair with all needs in reach, spouse present and nurse Aristides Jones notified. Will continue skilled PT to address goals as stated. Recommend HHPT for follow up physical therapy upon discharge to reach maximal level of independence/safety with functional mobility. Pt Education: pt educated in safety/technique during functional mobility tasks Patient will benefit from skilled intervention to address the above impairments. Patients rehabilitation potential is considered to be Good Factors which may influence rehabilitation potential include:  
[x]         None noted 
[]         Mental ability/status []         Medical condition 
[]         Home/family situation and support systems 
[]         Safety awareness 
[]         Pain tolerance/management 
[]         Other: PLAN : 
Recommendations and Planned Interventions: 
[x]           Bed Mobility Training             []    Neuromuscular Re-Education 
[x]           Transfer Training                   []    Orthotic/Prosthetic Training 
[x]           Gait Training                          []    Modalities [x]           Therapeutic Exercises          []    Edema Management/Control 
[x]           Therapeutic Activities            [x]    Patient and Family Training/Education 
[]           Other (comment): Frequency/Duration: Patient will be followed by physical therapy twice daily to address goals. Discharge Recommendations: Home Health Further Equipment Recommendations for Discharge: N/A  
 
SUBJECTIVE:  
Patient stated I feel alright sitting here.  OBJECTIVE DATA SUMMARY:  
 
Past Medical History:  
Diagnosis Date  Allergic rhinitis  Arrhythmia   
 afib  Arthritis  Cancer [facility])   
 prostate  Concussion   
 no loc but lose of memory after that event for that day  Hypertension 2016 Past Surgical History:  
Procedure Laterality Date  HX HEENT    
 sinus opened  HX HERNIA REPAIR  age 13  
 right  HX ORTHOPAEDIC    
 cartilege removed from right knee  HX PROSTATECTOMY  2003  HX TONSILLECTOMY  HX VASECTOMY Barriers to Learning/Limitations: None Compensate with: N/A Prior Level of Function/Home Situation: amb without device PTA Home Situation Home Environment: Private residence # Steps to Enter: 6 Rails to Enter: Yes Hand Rails : Bilateral 
One/Two Story Residence: One story Living Alone: No 
Support Systems: Spouse/Significant Other/Partner Patient Expects to be Discharged to[de-identified] Private residence Current DME Used/Available at Home: Cane, straight, Grab bars, Walker, rolling Tub or Shower Type: Tub/Shower combinationCritical Behavior: 
Neurologic State: Alert; Appropriate for age Orientation Level: Oriented X4 Cognition: Follows commands Safety/Judgement: Awareness of environment; Fall prevention Psychosocial 
Patient Behaviors: Calm; Cooperative Family  Behaviors: Calm;Supportive Purposeful Interaction: Yes Pt Identified Daily Priority: Clinical issues (comment) Caritas Process: Nurture loving kindness;Establish trust;Teaching/learning; Attend basic human needs;Create healing environment;Supportive expression Caring Interventions: Reassure Reassure: Therapeutic listening; Informing;Quiet presence;Caring roundsSkin Condition/Temp: Dry;Warm Family  Behaviors: Calm;Supportive Skin Integrity: Incision (comment)(surgical incision to right hip) Skin Integumentary Skin Color: Appropriate for ethnicity Skin Condition/Temp: Dry;Warm 
Skin Integrity: Incision (comment)(surgical incision to right hip) Turgor: Non-tenting Hair Growth: Present Varicosities: Absent Strength:   
Strength: Generally decreased, functional 
Tone & Sensation:  
Sensation: Intact Range Of Motion: 
AROM: Generally decreased, functional 
Functional Mobility: 
Transfers: 
Sit to Stand: Contact guard assistance(vc) 
Stand to Sit: Contact guard assistance; Other (comment)(vc) 
Balance:  
Sitting: Intact Standing: Intact; With supportAmbulation/Gait Training: 
Distance (ft): 110 Feet (ft) Assistive Device: Gait belt;Walker, rolling Ambulation - Level of Assistance: Contact guard assistance; Other (comment)(vc) 
Gait Description (WDL): Exceptions to [facility] Gait Abnormalities: Antalgic;Decreased step clearance Right Side Weight Bearing: As tolerated Base of Support: Widened Speed/Mila: Fluctuations Step Length: Left shortened;Right shortened Swing Pattern: Left asymmetrical;Right asymmetrical 
Interventions: Safety awareness training; Tactile cues; Verbal cues; Visual/Demos Therapeutic Exercises:  
Issued HEP per protocol and instructed in AP to be performed while up in chair Pain: 
Pain Scale 1: Numeric (0 - 10) Pain Intensity 1: 2 Pain Location 1: Hip Pain Orientation 1: Right Pain Description 1: Aching Pain Intervention(s) 1: Ice Activity Tolerance:  
Fair Please refer to the flowsheet for vital signs taken during this treatment. After treatment:  
[x]         Patient left in no apparent distress sitting up in chair 
[]         Patient left in no apparent distress in bed 
[x]         Call bell left within reach [x]         Nursing notified 
[x]         Caregiver present 
[]         Bed alarm activated COMMUNICATION/EDUCATION:  
[x]         Fall prevention education was provided and the patient/caregiver indicated understanding. [x]         Patient/family have participated as able in goal setting and plan of care. [x]         Patient/family agree to work toward stated goals and plan of care. []         Patient understands intent and goals of therapy, but is neutral about his/her participation. []         Patient is unable to participate in goal setting and plan of care. Eval Complexity: History: HIGH Complexity :3+ comorbidities / personal factors will impact the outcome/ POC Exam:LOW Complexity : 1-2 Standardized tests and measures addressing body structure, function, activity limitation and / or participation in recreation  Presentation: LOW Complexity : Stable, uncomplicated  Clinical Decision Making:Low Complexity amb >30ft with RW/CGA Overall Complexity:LOW Thank you for this referral. 
Venessa Quan, PT Time Calculation: 29 mins

## 2019-01-26 NOTE — PROGRESS NOTES
Chart reviewed by CM on call. Transition of care, home with home health. Met with patient and wife at bedside. States has RW at home. Has not decided on a [facility] provider. List left at bedside. CM will follow up to place referral.   
 
Care Management Interventions PCP Verified by CM: Yes Mode of Transport at Discharge: Other (see comment)(wife) Transition of Care Consult (CM Consult): Home Health Current Support Network: Lives with Spouse Confirm Follow Up Transport: Family Plan discussed with Pt/Family/Caregiver: Yes Freedom of Choice Offered: Yes Reason for Admission:   Right total right hip RRAT Score:   10 Plan for utilizing home health:  TBD, list left at bedside Likelihood of Readmission:  low Transition of Care Plan: home with home health              
 
 
 
[address] signed for [facility] health. Referral placed

## 2019-01-26 NOTE — PROGRESS NOTES
Problem: Falls - Risk of 
Goal: *Absence of Falls Document Sydney Fearing Fall Risk and appropriate interventions in the flowsheet. Outcome: Progressing Towards Goal 
Fall Risk Interventions: 
Mobility Interventions: Patient to call before getting OOB, Utilize walker, cane, or other assistive device Medication Interventions: Patient to call before getting OOB, Teach patient to arise slowly Elimination Interventions: Call light in reach, Toileting schedule/hourly rounds, Patient to call for help with toileting needs

## 2019-01-26 NOTE — PROGRESS NOTES
0803 Received report from Michelle Rangel RN at patients bedside. Patient in bed. Call bell within reach, gripper socks on, TEDs on, and pt in no apparent distress. 0900 Shift assessment completed. Pt A&O x 4. Lung sounds clear bilaterally. Apical pulse reg. Strength in upper extremities strong and equal bilaterally. Capillary refill less than 3 seconds bilaterally. Radial pulses palpable and equal bilaterally. 18 G IV site to left hand clean, dry and intact. Skin warm to the touch and dry. mepilex dressing to right hip is CDI . Pedal pulses palpable bilaterally. Strength in right lower extremity was weak, and the left lower extremity was weak. PT denies SOB & numbness/tingling. Bilateral WILMER stockings on. SCDs on bilateral. Patient states pain was 0/10. Call bell within reach and gripper socks on. Fall band on. 
 
0920 Patient stated pain was 6/10. PRN Dilaudid 4mg given. Patient educated on side effects and will monitor for relief. 1444 Patient stated pain was 6/10. PRN Roxicodone 10mg given. Patient educated on side effects and will monitor for relief. 1548 Bedside and Verbal shift change report given to [facility] SESAR RN  by Juan Miguel Daniels RN. Report included the following information SBAR, Kardex, Intake/Output, MAR and Recent Results. Pt in no distress,call bell within reach, and gripper socks on.

## 2019-01-26 NOTE — PROGRESS NOTES
Problem: Falls - Risk of 
Goal: *Absence of Falls Document Orcarol Alonso Fall Risk and appropriate interventions in the flowsheet. Outcome: Progressing Towards Goal 
Fall Risk Interventions: 
Mobility Interventions: Patient to call before getting OOB, PT Consult for mobility concerns, PT Consult for assist device competence, Utilize walker, cane, or other assistive device Medication Interventions: Patient to call before getting OOB, Teach patient to arise slowly Elimination Interventions: Patient to call for help with toileting needs

## 2019-01-26 NOTE — DISCHARGE SUMMARY
[address] [address]     DISCHARGE SUMMARY     PATIENT: Jenise Ca     MRN: [id number]   ADMIT DATE: 2019   [facility]   DISCHARGE DATE:  19     ATTENDING: Sarita Powell MD   DICTATING: Saintclair Felix, MD     ADMISSION DIAGNOSIS: RIGHT HIP OSTEOARTHRITIS  Status post total replacement of right hip    DISCHARGE DIAGNOSIS: Status post RIGHT HIP OSTEOARTHRITIS     HISTORY OF PRESENT ILLNESS: The patient is a 74y.o. year-old male   with ongoing right hip pain secondary to osteoarthritis of right hip(s). The patient's pain has persisted and progressed despite conservative treatments and therapies. The patient has at this time opted for surgical intervention. PAST MEDICAL HISTORY:   Past Medical History:   Diagnosis Date    Allergic rhinitis     Arrhythmia     afib    Arthritis     Cancer ([facility] [address]     prostate    Concussion     no loc but lose of memory after that event for that day    Hypertension        PAST SURGICAL HISTORY:   Past Surgical History:   Procedure Laterality Date    HX HEENT      sinus opened    HX HERNIA REPAIR  age 13    right   [facility] HX ORTHOPAEDIC      cartilege removed from right knee    HX PROSTATECTOMY      HX TONSILLECTOMY      HX VASECTOMY         ALLERGIES: No Known Allergies     CURRENT MEDICATIONS:  A list of medications prior to the time of admission include:  Prior to Admission medications    Medication Sig Start Date End Date Taking? Authorizing Provider   fluticasone (FLONASE) 50 mcg/actuation nasal spray 2 Sprays by Both Nostrils route daily. Yes Provider, Historical   polyethylene glycol (MIRALAX) 17 gram packet Mix 1 packet (17g) into 4-8 ounces of beverage and drink once daily as needed Results in 2-4 days, Max treatment length of 2 weeks. 18  Yes Provider, Historical   docusate sodium (STOOL SOFTENER PO) Take  by mouth as needed.     Provider, Historical sennosides (EX-LAX) 15 mg tablet Take  by mouth as needed for Constipation. Provider, Historical   ibuprofen (MOTRIN) 200 mg tablet Take 800 mg by mouth. Provider, Historical   mupirocin calcium (BACTROBAN NASAL) 2 % nasal ointment by Both Nostrils route two (2) times a day. Provider, Historical   apixaban (ELIQUIS) 5 mg tablet 5 mg two (2) times a day. 12/26/18   Provider, Historical   diclofenac potassium (CATAFLAM) 50 mg tablet 50 mg daily (after lunch). 11/26/18   Provider, Historical   therapeutic multivitamin (THERAGRAN) tablet Take 1 Tab by Mouth Once a Day. Provider, Historical   valsartan-hydroCHLOROthiazide (DIOVAN-HCT) 160-12.5 mg per tablet Take 1 Tab by Mouth Once a Day. 11/21/18   Provider, Historical       FAMILY HISTORY: History reviewed. No pertinent family history. SOCIAL HISTORY:   Social History     Socioeconomic History    Marital status:      Spouse name: Not on file    Number of children: Not on file    Years of education: Not on file    Highest education level: Not on file   Tobacco Use    Smoking status: Former Smoker    Smokeless tobacco: Never Used    Tobacco comment: quit pipe in college   Substance and Sexual Activity    Alcohol use: Yes     Comment: 5 drinks daily=30    Drug use: No       REVIEW OF SYSTEMS: All review of systems are negative. PHYSICAL EXAMINATION: For a detailed physical exam, please refer to the patient's chart. HOSPITAL COURSE: The patient was taken to surgery the day of admission. he underwent right total hip replacement(s) via the anterior approach. Operative course was benign. Estimated blood loss approximately 300 mL. The patient was taken to the PACU in stable condition and was later taken to the floor in stable condition. Post-op Day #2, patient has done very well.  he has had little to no pain. he had been cleared by physical therapy with stair training. he was placed on Eliquis for DVT prophylaxis.   his vitals have remained stable. he has also remained hemodynamically stable. The patient has been recommended for discharge home with Home Health. DISCHARGE INSTRUCTIONS: The patient is to be discharged home with Home Health. he is to continue on his prior medications per the medication reconciliation form, to which we will add:  1. Percocet 5/325 mg; 1-2 tablets p.o. every 4 to 6 hours as needed for pain    The patient is to continue at home with home physical therapy 3 times a week to work on gait training, range of motion, strengthening, and weightbearing exercises as tolerated on his right lower extremity(ies). The patient is to progress from a walker to a cane to complete total weightbearing as tolerable. The patient is to continue to keep his incision dry. The patient is to followup with Dr. Lex Whitmore in the office in 1-2 weeks status post for x-rays and further evaluation.       Flor Miller MD  1/94/060712:89 AM

## 2019-01-26 NOTE — PROGRESS NOTES
Problem: Mobility Impaired (Adult and Pediatric) Goal: *Acute Goals and Plan of Care (Insert Text) Physical Therapy Goals Initiated 1/26/2019  to be met within 1-2 days STG's: 
1. Bed mobility:  Supine to/from sit with S in prep for ADL activity. 2. Transfers:  Sit to/from stand with S in prep for gait. 3. Tolerate sitting up in chair >30min for functional activity performance. LTG's 1. Ambulation:  Ambulate 150 ft.with S with LRAD for home mobility at discharge. 2. Patient Education:  S/Independent with HEP for home performance accurately at discharge. 3. Step Negotiation: Ascend/Descend 6 steps with CGA with HR for home navigation as indicated. Outcome: Progressing Towards Goal 
physical Therapy TREATMENT Patient: Jayden Aldrich (1 y.o. male) Date: 1/26/2019 Diagnosis: RIGHT HIP OSTEOARTHRITIS Status post total replacement of right hip <principal problem not specified> Procedure(s) (LRB): 
RIGHT TOTAL HIP ARTHROPLASTY ANTERIOR APPROACH WITH C-ARM, \"SPEC POP\" (Right) 1 Day Post-Op Precautions: Fall, WBAT Chart, physical therapy assessment, plan of care and goals were reviewed. PLOF: ambulatory with a cane, RW delivered ASSESSMENT: 
No assistance needed for bed mobility. Elevated bed to height of bed at home, no difficulty with sit to stand. Ambulated 120ft with RW, VC to stay within JAM of RW, decreased step height and length R>L, sliding L foot through swing phase of gait. No LOB or path deviations. Reviewed HEP hand out and performed seated ROM strengthening exercises. Pt left sitting in chair, spouse present. Education: RW mgmt and safety, HEP 3x/day Progression toward goals: 
[x]      Improving appropriately and progressing toward goals 
[]      Improving slowly and progressing toward goals 
[]      Not making progress toward goals and plan of care will be adjusted PLAN: 
Patient continues to benefit from skilled intervention to address the above impairments. Continue treatment per established plan of care. Discharge Recommendations:  Home Health Further Equipment Recommendations for Discharge:  rolling walker SUBJECTIVE:  
Patient stated When I don't move right now a 5. OBJECTIVE DATA SUMMARY:  
Critical Behavior: 
Neurologic State: Alert, Appropriate for age Orientation Level: Oriented X4 Cognition: Follows commands Safety/Judgement: Awareness of environment, Fall prevention Functional Mobility Training: 
Bed Mobility: 
Supine to Sit: Supervision Transfers: 
Sit to Stand: Contact guard assistance;Stand-by assistance Stand to Sit: Contact guard assistance Balance: 
Sitting: Intact Standing: Intact; With supportAmbulation/Gait Training: 
Distance (ft): 120 Feet (ft) Assistive Device: Gait belt;Walker, rolling Ambulation - Level of Assistance: Contact guard assistance;Stand-by assistance Gait Description (WDL): Exceptions to [facility] Gait Abnormalities: Antalgic;Decreased step clearance;Shuffling gait Right Side Weight Bearing: As tolerated Base of Support: Widened Speed/Mila: Slow;Shuffled Step Length: Right shortened;Left shortened Swing Pattern: Right asymmetrical 
Interventions: Safety awareness training; Tactile cues; Verbal cues; Visual/Demos Therapeutic Exercises:  
Seated: march, LAQ, hip add with pillow 10x ea Pain: 
Pre:5 Post:5-6 Pain Scale 1: Numeric (0 - 10) Pain Location 1: Hip Pain Orientation 1: Right Pain Description 1: Aching Pain Intervention(s) 1: Ice Activity Tolerance:  
Fair+ Please refer to the flowsheet for vital signs taken during this treatment. After treatment:  
[x] Patient left in no apparent distress sitting up in chair 
[] Patient left in no apparent distress in bed 
[x] Call bell left within reach 
[] Nursing notified 
[x] Caregiver present 
[] Bed alarm activated Kavya Kaur PTA Time Calculation: 24 mins

## 2019-01-26 NOTE — DISCHARGE INSTRUCTIONS
[facility]    Patient Discharge Instructions    Guadalupe Kimball / [id number] : 1945    Admitted 2019 Discharged: 2019     IF YOU HAVE ANY PROBLEMS ONCE YOU ARE AT HOME CALL THE FOLLOWING NUMBERS:   Main office number: [phone]    Your follow up appointment to see Dr. Khushboo Alvarez is scheduled in 1-2 weeks. If you are unsure of your appointment date call the office at [phone]. Take Home Medications     · Resume your home medictions as directed  · A prescription for pain medication has been given   · It is important that you take the medication exactly as they are prescribed. · Keep your medication in the bottles provided by the pharmacist and keep a list of the medication names, dosages, and times to be taken in your wallet. · Do not take other medications without consulting your doctor. · Note:  If you have already received and/or filled a prescription for one or more of the medications you've received a prescription for when leaving the hospital, you may disregard the duplicate prescription. What to do at [address] [place] [address] Ave your prehospital diet. If you have excessive nausea or vomitting call your doctor's office    Wear portable sequential compressive devices at least 18 hours per day for 2 weeks. They may be used beyond 2 weeks as needed to help control swelling. WILMER hose should be worn during the day - may be removed for sleep. Should be worn on both legs for 2 weeks and then on the operative extremity for a total of 6 weeks. Begin In-Home Physical Therapy; 3 times a week to work on gait training, range of motion, strengthening, and weight bearing exercises as tolerable. Continue to use your walker or cane when walking; may progress from the walker to a cane to complete total bearing as tolerable. Keep incision DRY. You may need to sponge bathe to avoid getting the incision wet.       When to Call    - Call if you have a temperature greater then 101  - Unable to keep food down  - Are unable to bear any wieght   - Need a pain medication refill     Information obtained by :  I understand that if any problems occur once I am at home I am to contact my physician. I understand and acknowledge receipt of the instructions indicated above.                                                                                                                                            Physician's or R.N.'s Signature                                                                  Date/Time                                                                                                                                              Patient or Representative Signature                                                          Date/Time

## 2019-01-26 NOTE — PROGRESS NOTES
Progress Note Patient: Jayden Aldrich MRN: [id number]  SSN: [id number] YOB: 1945  Age: 74 y.o. Sex: male 1 Day Post-Op status post Procedure(s) (LRB): 
RIGHT TOTAL HIP ARTHROPLASTY ANTERIOR APPROACH WITH C-ARM, \"SPEC POP\" (Right) Admit Date: 2019 Admit Diagnosis: RIGHT HIP OSTEOARTHRITIS Status post total replacement of right hip Subjective:   
 
Doing well. No complaints. No SOB. No Chest Pain. No Nausea or Vomiting. No problems eating or voiding. Objective:  
  
 
Temp (24hrs), Av.7 °F (36.5 °C), Min:96.8 °F (36 °C), Max:98.7 °F (37.1 °C) Body mass index is 31.08 kg/m². Patient Vitals for the past 12 hrs: 
 BP Temp Pulse Resp SpO2  
19 0722 122/78 98.7 °F (37.1 °C) 86 16 98 % 19 0324 111/78 97.9 °F (36.6 °C) 83 16 100 % 19 0012 126/73 98.2 °F (36.8 °C) 84 16 96 % No results for input(s): HGB, HCT, INR, NA, K, CL, CO2, BUN, CREA, GLU, HGBEXT, HCTEXT in the last 72 hours. No lab exists for component: INREXT Physical Exam: 
Vital Signs are Stable. No Acute Distress. Alert and Oriented. Negative Homans sign. Toes AROM Full. Neurovascular exam is normal.   
Dressing is Clean, Dry, and Intact. Assessment/Plan: 1. Patient doing well. 2. Out of BED / PT / WBAT. Anterior Hip Precautions 3. DVT ppx: Mobilization, Eliquis, WILMER hose, and mechanical compression 4. D/c planning Continue PT/OT Discharge Plan: Home with Home Health today v tomorrow Pending PT clearance with STAIRS and oral analgesia Signed By: Anali Quiroz MD   
 2019

## 2019-01-26 NOTE — ROUTINE PROCESS
Bedside report received from Tamie Allen. Patient in chair at this time. Plan of care for the day addressed with the patient. Care assumed at this time.

## 2019-01-26 NOTE — PROGRESS NOTES
1735-Pt reminded to use call light and request help with ambulating, pt observed standing without nurse present, needs reinforcement to have staff assist him. Also took of gripper socks, gave new pair explained importance of wearing gripper socks to avoid a slip and fall, socks placed back on. Call light left in reach. 1658-Pain 4/10. PRN Roxicodone 5 mg pain medication administered at this time. Patient has been educated on side effects.

## 2019-01-27 ENCOUNTER — HOME HEALTH ADMISSION (OUTPATIENT)
Dept: HOME HEALTH SERVICES | Facility: HOME HEALTH | Age: 74
End: 2019-01-27

## 2019-01-27 VITALS
TEMPERATURE: 98.3 F | DIASTOLIC BLOOD PRESSURE: 88 MMHG | WEIGHT: 204.38 LBS | HEART RATE: 84 BPM | RESPIRATION RATE: 16 BRPM | BODY MASS INDEX: 30.98 KG/M2 | HEIGHT: 68 IN | OXYGEN SATURATION: 100 % | SYSTOLIC BLOOD PRESSURE: 157 MMHG

## 2019-01-27 PROCEDURE — 97116 GAIT TRAINING THERAPY: CPT

## 2019-01-27 PROCEDURE — 77030037875 HC DRSG MEPILEX <16IN BORD MOLN -A

## 2019-01-27 PROCEDURE — 74011250637 HC RX REV CODE- 250/637: Performed by: ORTHOPAEDIC SURGERY

## 2019-01-27 RX ADMIN — VALSARTAN: 160 TABLET, FILM COATED ORAL at 08:12

## 2019-01-27 RX ADMIN — APIXABAN 5 MG: 5 TABLET, FILM COATED ORAL at 03:38

## 2019-01-27 RX ADMIN — FERROUS SULFATE TAB 325 MG (65 MG ELEMENTAL FE) 325 MG: 325 (65 FE) TAB at 08:12

## 2019-01-27 RX ADMIN — ACETAMINOPHEN 1000 MG: 500 TABLET, FILM COATED ORAL at 10:01

## 2019-01-27 RX ADMIN — ACETAMINOPHEN 1000 MG: 500 TABLET, FILM COATED ORAL at 03:38

## 2019-01-27 RX ADMIN — STANDARDIZED SENNA CONCENTRATE AND DOCUSATE SODIUM 1 TABLET: 8.6; 5 TABLET, FILM COATED ORAL at 08:12

## 2019-01-27 RX ADMIN — Medication 10 ML: at 06:42

## 2019-01-27 RX ADMIN — MULTIPLE VITAMINS W/ MINERALS TAB 1 TABLET: TAB at 08:12

## 2019-01-27 RX ADMIN — POLYETHYLENE GLYCOL 3350 17 G: 17 POWDER, FOR SOLUTION ORAL at 08:11

## 2019-01-27 RX ADMIN — LORAZEPAM 0.5 MG: 0.5 TABLET ORAL at 06:42

## 2019-01-27 NOTE — PROGRESS NOTES
Problem: Mobility Impaired (Adult and Pediatric) Goal: *Acute Goals and Plan of Care (Insert Text) Physical Therapy Goals Initiated 1/26/2019  to be met within 1-2 days STG's: 
1. Bed mobility:  Supine to/from sit with S in prep for ADL activity. 2. Transfers:  Sit to/from stand with S in prep for gait. 3. Tolerate sitting up in chair >30min for functional activity performance. LTG's 1. Ambulation:  Ambulate 150 ft.with S with LRAD for home mobility at discharge. 2. Patient Education:  S/Independent with HEP for home performance accurately at discharge. 3. Step Negotiation: Ascend/Descend 6 steps with CGA with HR for home navigation as indicated. Outcome: Resolved/Met Date Met: 01/27/19 
physical Therapy TREATMENT/DISCHARGE Patient: Екатерина Garcia (1 y.o. male) Date: 1/27/2019 Diagnosis: RIGHT HIP OSTEOARTHRITIS Status post total replacement of right hip <principal problem not specified> Procedure(s) (LRB): 
RIGHT TOTAL HIP ARTHROPLASTY ANTERIOR APPROACH WITH C-ARM, \"SPEC POP\" (Right) 2 Days Post-Op Precautions: Fall, WBAT Chart, physical therapy assessment, plan of care and goals were reviewed. ASSESSMENT: 
Pt standing in room with RW, per wife pt is practicing. Followed up this session for RW practice and safety. Pt ambulated 140ft this session with RW, reciprocal gt pattern, short shuffling steps, staying within JAM of RW without VC, no LOB, pt fatigued upon returning to room, left sitting in chair. EDUCATION RW mgmt and safety, HEP 3x/day, ambulate hourly as tolerated, ice Progression toward goals: 
[x]      Goals met 
[]      Improving appropriately and progressing toward goals 
[]      Improving slowly and progressing toward goals 
[]      Not making progress toward goals and plan of care will be adjusted PLAN: 
Patient will be discharged from physical therapy at this time. Rationale for discharge: 
[x] Goals Achieved 
[] Plateau Reached [] Patient not participating in therapy 
[] Other: 
Discharge Recommendations:  Home Health Further Equipment Recommendations for Discharge:  rolling walker SUBJECTIVE:  
Patient stated . OBJECTIVE DATA SUMMARY:  
 
Critical Behavior: 
Neurologic State: Alert Orientation Level: Oriented X4 Cognition: Appropriate decision making, Appropriate for age attention/concentration, Appropriate safety awareness, Follows commands Safety/Judgement: Awareness of environment, Fall prevention Functional Mobility Training: 
Bed Mobility: 
Sit to Supine: Supervision Transfers: 
Sit to Stand: Contact guard assistance Stand to Sit: Stand-by assistance Balance: 
Sitting: Intact Standing: Intact; With support Standing - Static: Good Standing - Dynamic : FairAmbulation/Gait Training: 
Distance (ft): 140 Feet (ft) Assistive Device: Gait belt;Walker, rolling Ambulation - Level of Assistance: Stand-by assistance;Supervision Gait Abnormalities: Antalgic;Decreased step clearance;Shuffling gait Right Side Weight Bearing: As tolerated Base of Support: Narrowed Speed/Mila: Slow Step Length: Right shortened;Left shortened Stairs: 
Number of Stairs Trained: 8 Stairs - Level of Assistance: Contact guard assistance Rail Use: Both 
Pain: 
Pre treatment pain:  0 Post treatment pain:  0 Pain Scale 1: Numeric (0 - 10) Activity Tolerance:  
Fair Please refer to the flowsheet for vital signs taken during this treatment. After treatment:  
[x] Patient left in no apparent distress sitting up in chair 
[] Patient left in no apparent distress in bed 
[x] Call bell left within reach [x] Nursing notified 
[x] Caregiver present 
[] Bed alarm activated Ben Pompa PTA Time Calculation: 11 mins

## 2019-01-27 NOTE — PROGRESS NOTES
Problem: Mobility Impaired (Adult and Pediatric) Goal: *Acute Goals and Plan of Care (Insert Text) Physical Therapy Goals Initiated 1/26/2019  to be met within 1-2 days STG's: 
1. Bed mobility:  Supine to/from sit with S in prep for ADL activity. 2. Transfers:  Sit to/from stand with S in prep for gait. 3. Tolerate sitting up in chair >30min for functional activity performance. LTG's 1. Ambulation:  Ambulate 150 ft.with S with LRAD for home mobility at discharge. 2. Patient Education:  S/Independent with HEP for home performance accurately at discharge. 3. Step Negotiation: Ascend/Descend 6 steps with CGA with HR for home navigation as indicated. Outcome: Progressing Towards Goal 
physical Therapy TREATMENT Patient: Chuy Mckenna (8 y.o. male) Date: 1/27/2019 Diagnosis: RIGHT HIP OSTEOARTHRITIS Status post total replacement of right hip <principal problem not specified> Procedure(s) (LRB): 
RIGHT TOTAL HIP ARTHROPLASTY ANTERIOR APPROACH WITH C-ARM, \"SPEC POP\" (Right) 2 Days Post-Op Precautions: Fall, WBAT Chart, physical therapy assessment, plan of care and goals were reviewed. ASSESSMENT: 
Pt sitting in a chair without arm rests upon entering room. Increased VC for UE placement for sit to stand. Ambulated 150ft with RW, short shuffling steps with (B)LEs, festinating at times, increased VC to stay within JAM of RW,  VC to increased hip and knee flexion to clear feet from the floor vs sliding, no LOB. Negotiated 8 steps holding R hand rail with (B) hands and increased VC for tech and safety. Pt able to get LEs into bed without assistance. Education: RW mgmt and safety, stair nego tech, UE placement for sit<>stand Progression toward goals: 
[x]      Improving appropriately and progressing toward goals 
[]      Improving slowly and progressing toward goals 
[]      Not making progress toward goals and plan of care will be adjusted PLAN: 
 Patient continues to benefit from skilled intervention to address the above impairments. Continue treatment per established plan of care. Discharge Recommendations:  Home Health Further Equipment Recommendations for Discharge:  rolling walker SUBJECTIVE:  
Patient stated ok.  OBJECTIVE DATA SUMMARY:  
Critical Behavior: 
Neurologic State: Alert Orientation Level: Oriented X4 Cognition: Appropriate decision making, Appropriate for age attention/concentration, Appropriate safety awareness, Follows commands Safety/Judgement: Awareness of environment, Fall prevention Functional Mobility Training: 
Bed Mobility: 
Sit to Supine: Supervision Transfers: 
Sit to Stand: Contact guard assistance Stand to Sit: Contact guard assistance Balance: 
Sitting: Intact Standing: Impaired; With support Standing - Static: Good Standing - Dynamic : FairAmbulation/Gait Training: 
Distance (ft): 150 Feet (ft) Assistive Device: Gait belt;Walker, rolling Ambulation - Level of Assistance: Contact guard assistance Gait Abnormalities: Antalgic;Decreased step clearance; Festinating gait; Shuffling gait Right Side Weight Bearing: As tolerated Base of Support: Narrowed Speed/Mila: Slow;Shuffled Step Length: Right shortened;Left shortened Stairs: 
Number of Stairs Trained: 8 Stairs - Level of Assistance: Contact guard assistance Rail Use: BothPain: 
Pre:0 Post:0 Pain Scale 1: Numeric (0 - 10) Activity Tolerance:  
Fair+ Please refer to the flowsheet for vital signs taken during this treatment. After treatment:  
[] Patient left in no apparent distress sitting up in chair 
[x] Patient left in no apparent distress in bed 
[x] Call bell left within reach [x] Nursing notified 
[x] Caregiver present 
[] Bed alarm activated José Miguel Lynn PTA Time Calculation: 17 mins

## 2019-01-27 NOTE — ROUTINE PROCESS
Bedside and Verbal shift change report given to DENISA Goldsmith RN by Chuy Qiu RN. Report included the following information SBAR, Kardex, OR Summary, Intake/Output and MAR.

## 2019-01-27 NOTE — PROGRESS NOTES
26 Assumed care of patient from Halle Harry RN. Shift assessment initiated. Pain voiced at 1/10, will medicate per [facility] at appropriate time. IS at bedside and encouraged. Patient in bed, bed alarm utilized over night due to confusion. A/O x4 at the moment. Ice at site. No complaints at this time. All questions and concerns answered. All needs met. Bed in the lowest position. Call bell/phone within reach. No signs of distress noted. Will monitor for changes. 3439 Scheduled medications given. Pain voiced at 1/10, patient sitting on the side of the bed. Spouse at bedside. Ice applied to site. No needs at this time. 1000 Pain voiced at 0/10, patient OOB in chair. Medicated per MAR. No needs at this time. Preparing for discharge.

## 2019-01-27 NOTE — PROGRESS NOTES
[address] care at this time. Pain rated 4/10. Pt up in chair, family at bedside. No signs of distress. Will continue to monitor. Pt encouraged to call for assistance. 2200 - Patient in bed at this time. Patient A&Ox1, RA. Pt disoriented, wife stated before she left that this has been his norm today. Will monitor pt and decrease narcotic administration. Denies chest pain and SOB. 18G IV to left hand  intact and patent. SCD compression device and TEDs bilaterally. Mepilex dressing to right hip CDI. Denies numbness/tingling/calf pain. Pain 0/10 with a tolerable level of 5/10. Pt educated on IS use, q2h rounds, pain management, CHG wipes and \"Up for Meals\". Pt verbalized understanding, no concerns voiced. Call bell within reach, bed in lowest position. Pt encouraged to call for assistance. Bed alarm on, door open. 2240 - Pt continuously trying to get OOB unassisted. Pt moved to room 302 for closer monitoring. 6076 - Pt attempting to ambulate without assistance. At this time patient is aware of where he is but not why. Pt is able to tell me who is physician is and the month, unsure of the date. Pt has gotten better compared to start of shift mentation. CHG wipes completed. Pt ambulated at bedside. No concerns voiced. Call bell within reach, bed in lowest position. Will continue to monitor. Bed alarm on. 
 
0510 - Wife contacted at this time. Update given on pt status and room number. 5739 - Pt A&Ox4 at this time. Dressing changed per MAR. Pain 0/10 when lying and 7/10 with movement. Will hold off on narcotics until seen by physician. Pt verbalized understanding. Bed in lowest position, call bell within reach.

## 2019-01-27 NOTE — ROUTINE PROCESS
Bedside and Verbal shift change report given to Durga hurtado RN by Isela Vick RN. Report included the following information SBAR, Kardex, OR Summary, Intake/Output and MAR.

## 2019-01-27 NOTE — PROGRESS NOTES
Problem: Falls - Risk of 
Goal: *Absence of Falls Document Gareth Rose Fall Risk and appropriate interventions in the flowsheet. Outcome: Progressing Towards Goal 
Fall Risk Interventions: 
Mobility Interventions: Patient to call before getting OOB, PT Consult for mobility concerns, PT Consult for assist device competence, Utilize walker, cane, or other assistive device Medication Interventions: Patient to call before getting OOB, Teach patient to arise slowly Elimination Interventions: Call light in reach

## 2019-01-28 ENCOUNTER — HOME CARE VISIT (OUTPATIENT)
Dept: HOME HEALTH SERVICES | Facility: HOME HEALTH | Age: 74
End: 2019-01-28

## 2019-01-28 ENCOUNTER — HOME CARE VISIT (OUTPATIENT)
Dept: SCHEDULING | Facility: HOME HEALTH | Age: 74
End: 2019-01-28

## 2019-01-28 ENCOUNTER — HOME HEALTH ADMISSION (OUTPATIENT)
Dept: HOME HEALTH SERVICES | Facility: HOME HEALTH | Age: 74
End: 2019-01-28
Payer: MEDICARE

## 2019-01-28 VITALS
HEART RATE: 53 BPM | RESPIRATION RATE: 16 BRPM | WEIGHT: 199 LBS | BODY MASS INDEX: 30.16 KG/M2 | HEIGHT: 68 IN | TEMPERATURE: 97.6 F | SYSTOLIC BLOOD PRESSURE: 122 MMHG | DIASTOLIC BLOOD PRESSURE: 81 MMHG | OXYGEN SATURATION: 96 %

## 2019-01-29 ENCOUNTER — HOME CARE VISIT (OUTPATIENT)
Dept: SCHEDULING | Facility: HOME HEALTH | Age: 74
End: 2019-01-29
Payer: MEDICARE

## 2019-01-29 PROCEDURE — 3331090001 HH PPS REVENUE CREDIT

## 2019-01-29 PROCEDURE — 400013 HH SOC

## 2019-01-29 PROCEDURE — 3331090002 HH PPS REVENUE DEBIT

## 2019-01-29 PROCEDURE — G0299 HHS/HOSPICE OF RN EA 15 MIN: HCPCS

## 2019-01-29 NOTE — Clinical Note
Dr Lui Mcqueen, During Mr. Bright's home health admission a few medication discrepancies were found. The following drugs were not on the discharge summary but the patient takes them: 
Bisacodyl suppository 10mg 1suppository per rectum daily as needed for constipation. Loratadine 10mg 1 tab by mouth every morning. Sildenafil 50mg 1 tab by mouth daily as needed for ED. Tylenol 500mg 2 tabs every 6 hrs as needed for pain. (See below) Is it acceptable for the patient to take these? Mr Joyce Wells has decided he is not going to take the Percocet you prescribed. He feels his pain is adequately controlled with Tylenol ES. Thanks.

## 2019-01-29 NOTE — Clinical Note
Dr Praveen Oquendo, Wanted to bring to your attention that Mr. Cintron has a severe drug interaction: 
Drug-Drug: diclofenac potassium and apixaban 
Concurrent use of apixaban(1), betrixaban(7), edoxaban(5), or rivaroxaban(2) with NSAIDs may result in unwanted bleeding episodes. Thanks.

## 2019-01-30 VITALS
HEART RATE: 58 BPM | SYSTOLIC BLOOD PRESSURE: 130 MMHG | DIASTOLIC BLOOD PRESSURE: 65 MMHG | TEMPERATURE: 97.5 F | RESPIRATION RATE: 18 BRPM

## 2019-01-30 PROCEDURE — 3331090002 HH PPS REVENUE DEBIT

## 2019-01-30 PROCEDURE — 3331090001 HH PPS REVENUE CREDIT

## 2019-01-31 ENCOUNTER — HOME CARE VISIT (OUTPATIENT)
Dept: SCHEDULING | Facility: HOME HEALTH | Age: 74
End: 2019-01-31
Payer: MEDICARE

## 2019-01-31 VITALS
SYSTOLIC BLOOD PRESSURE: 150 MMHG | TEMPERATURE: 97.7 F | OXYGEN SATURATION: 96 % | HEART RATE: 67 BPM | DIASTOLIC BLOOD PRESSURE: 98 MMHG

## 2019-01-31 PROCEDURE — 3331090001 HH PPS REVENUE CREDIT

## 2019-01-31 PROCEDURE — 3331090002 HH PPS REVENUE DEBIT

## 2019-01-31 PROCEDURE — G0151 HHCP-SERV OF PT,EA 15 MIN: HCPCS

## 2019-02-01 ENCOUNTER — HOME CARE VISIT (OUTPATIENT)
Dept: SCHEDULING | Facility: HOME HEALTH | Age: 74
End: 2019-02-01
Payer: MEDICARE

## 2019-02-01 VITALS
TEMPERATURE: 97.4 F | RESPIRATION RATE: 18 BRPM | SYSTOLIC BLOOD PRESSURE: 118 MMHG | DIASTOLIC BLOOD PRESSURE: 76 MMHG | HEART RATE: 81 BPM | OXYGEN SATURATION: 96 %

## 2019-02-01 PROCEDURE — 3331090002 HH PPS REVENUE DEBIT

## 2019-02-01 PROCEDURE — G0299 HHS/HOSPICE OF RN EA 15 MIN: HCPCS

## 2019-02-01 PROCEDURE — 3331090001 HH PPS REVENUE CREDIT

## 2019-02-02 PROCEDURE — 3331090002 HH PPS REVENUE DEBIT

## 2019-02-02 PROCEDURE — 3331090001 HH PPS REVENUE CREDIT

## 2019-02-03 PROCEDURE — 3331090002 HH PPS REVENUE DEBIT

## 2019-02-03 PROCEDURE — 3331090001 HH PPS REVENUE CREDIT

## 2019-02-04 PROCEDURE — 3331090002 HH PPS REVENUE DEBIT

## 2019-02-04 PROCEDURE — 3331090001 HH PPS REVENUE CREDIT

## 2019-02-05 ENCOUNTER — HOME CARE VISIT (OUTPATIENT)
Dept: SCHEDULING | Facility: HOME HEALTH | Age: 74
End: 2019-02-05
Payer: MEDICARE

## 2019-02-05 ENCOUNTER — HOME CARE VISIT (OUTPATIENT)
Dept: HOME HEALTH SERVICES | Facility: HOME HEALTH | Age: 74
End: 2019-02-05
Payer: MEDICARE

## 2019-02-05 VITALS
TEMPERATURE: 97.7 F | OXYGEN SATURATION: 97 % | RESPIRATION RATE: 18 BRPM | HEART RATE: 78 BPM | DIASTOLIC BLOOD PRESSURE: 78 MMHG | SYSTOLIC BLOOD PRESSURE: 132 MMHG

## 2019-02-05 PROCEDURE — G0157 HHC PT ASSISTANT EA 15: HCPCS

## 2019-02-05 PROCEDURE — 3331090002 HH PPS REVENUE DEBIT

## 2019-02-05 PROCEDURE — G0299 HHS/HOSPICE OF RN EA 15 MIN: HCPCS

## 2019-02-05 PROCEDURE — 3331090001 HH PPS REVENUE CREDIT

## 2019-02-06 VITALS — HEART RATE: 80 BPM | SYSTOLIC BLOOD PRESSURE: 138 MMHG | OXYGEN SATURATION: 98 % | DIASTOLIC BLOOD PRESSURE: 70 MMHG

## 2019-02-06 PROCEDURE — 3331090002 HH PPS REVENUE DEBIT

## 2019-02-06 PROCEDURE — 3331090001 HH PPS REVENUE CREDIT

## 2019-02-07 PROCEDURE — 3331090001 HH PPS REVENUE CREDIT

## 2019-02-07 PROCEDURE — 3331090002 HH PPS REVENUE DEBIT

## 2019-02-08 ENCOUNTER — HOME CARE VISIT (OUTPATIENT)
Dept: SCHEDULING | Facility: HOME HEALTH | Age: 74
End: 2019-02-08
Payer: MEDICARE

## 2019-02-08 VITALS
TEMPERATURE: 98.2 F | DIASTOLIC BLOOD PRESSURE: 88 MMHG | SYSTOLIC BLOOD PRESSURE: 140 MMHG | RESPIRATION RATE: 20 BRPM | HEART RATE: 94 BPM | OXYGEN SATURATION: 99 %

## 2019-02-08 PROCEDURE — 3331090002 HH PPS REVENUE DEBIT

## 2019-02-08 PROCEDURE — G0299 HHS/HOSPICE OF RN EA 15 MIN: HCPCS

## 2019-02-08 PROCEDURE — G0157 HHC PT ASSISTANT EA 15: HCPCS

## 2019-02-08 PROCEDURE — 3331090001 HH PPS REVENUE CREDIT

## 2019-02-09 PROCEDURE — 3331090001 HH PPS REVENUE CREDIT

## 2019-02-09 PROCEDURE — 3331090002 HH PPS REVENUE DEBIT

## 2019-02-10 PROCEDURE — 3331090002 HH PPS REVENUE DEBIT

## 2019-02-10 PROCEDURE — 3331090001 HH PPS REVENUE CREDIT

## 2019-02-11 ENCOUNTER — HOME CARE VISIT (OUTPATIENT)
Dept: SCHEDULING | Facility: HOME HEALTH | Age: 74
End: 2019-02-11
Payer: MEDICARE

## 2019-02-11 VITALS
HEART RATE: 72 BPM | TEMPERATURE: 97.5 F | RESPIRATION RATE: 18 BRPM | SYSTOLIC BLOOD PRESSURE: 110 MMHG | OXYGEN SATURATION: 93 % | DIASTOLIC BLOOD PRESSURE: 64 MMHG

## 2019-02-11 PROCEDURE — G0299 HHS/HOSPICE OF RN EA 15 MIN: HCPCS

## 2019-02-11 PROCEDURE — 3331090002 HH PPS REVENUE DEBIT

## 2019-02-11 PROCEDURE — G0151 HHCP-SERV OF PT,EA 15 MIN: HCPCS

## 2019-02-11 PROCEDURE — 3331090003 HH PPS REVENUE ADJ

## 2019-02-11 PROCEDURE — 3331090001 HH PPS REVENUE CREDIT

## 2019-02-12 VITALS
TEMPERATURE: 97.6 F | DIASTOLIC BLOOD PRESSURE: 96 MMHG | OXYGEN SATURATION: 94 % | SYSTOLIC BLOOD PRESSURE: 144 MMHG | HEART RATE: 77 BPM

## 2019-02-12 PROCEDURE — 3331090002 HH PPS REVENUE DEBIT

## 2019-02-12 PROCEDURE — 3331090001 HH PPS REVENUE CREDIT

## 2019-02-13 PROCEDURE — 3331090002 HH PPS REVENUE DEBIT

## 2019-02-13 PROCEDURE — 3331090001 HH PPS REVENUE CREDIT

## 2019-08-08 PROBLEM — F10.10 ETOH ABUSE: Status: ACTIVE | Noted: 2019-08-08

## 2019-08-08 PROBLEM — E87.1 HYPONATREMIA: Status: ACTIVE | Noted: 2019-08-08

## 2019-08-08 PROBLEM — R04.0 EPISTAXIS: Status: ACTIVE | Noted: 2019-08-08

## 2019-08-09 ENCOUNTER — HOSPITAL ENCOUNTER (INPATIENT)
Age: 74
LOS: 3 days | Discharge: HOME HEALTH | DRG: 641 | End: 2019-08-12
Attending: FAMILY MEDICINE | Admitting: FAMILY MEDICINE
Payer: MEDICARE

## 2019-08-09 ENCOUNTER — APPOINTMENT (OUTPATIENT)
Dept: MRI IMAGING | Age: 74
DRG: 641 | End: 2019-08-09
Attending: FAMILY MEDICINE
Payer: MEDICARE

## 2019-08-09 ENCOUNTER — APPOINTMENT (OUTPATIENT)
Dept: GENERAL RADIOLOGY | Age: 74
DRG: 641 | End: 2019-08-09
Attending: FAMILY MEDICINE
Payer: MEDICARE

## 2019-08-09 LAB
ALBUMIN SERPL-MCNC: 3.1 G/DL (ref 3.4–5)
ALBUMIN/GLOB SERPL: 1.1 {RATIO} (ref 0.8–1.7)
ALP SERPL-CCNC: 56 U/L (ref 45–117)
ALT SERPL-CCNC: 33 U/L (ref 16–61)
ANION GAP SERPL CALC-SCNC: 10 MMOL/L (ref 3–18)
ANION GAP SERPL CALC-SCNC: 8 MMOL/L (ref 3–18)
AST SERPL-CCNC: 30 U/L (ref 10–38)
BILIRUB SERPL-MCNC: 1 MG/DL (ref 0.2–1)
BUN SERPL-MCNC: 37 MG/DL (ref 7–18)
BUN SERPL-MCNC: 40 MG/DL (ref 7–18)
BUN/CREAT SERPL: 45 (ref 12–20)
BUN/CREAT SERPL: 45 (ref 12–20)
CALCIUM SERPL-MCNC: 8.5 MG/DL (ref 8.5–10.1)
CALCIUM SERPL-MCNC: 8.6 MG/DL (ref 8.5–10.1)
CHLORIDE SERPL-SCNC: 87 MMOL/L (ref 100–111)
CHLORIDE SERPL-SCNC: 88 MMOL/L (ref 100–111)
CHOLEST SERPL-MCNC: 100 MG/DL
CK MB CFR SERPL CALC: 7 % (ref 0–4)
CK MB SERPL-MCNC: 7.4 NG/ML (ref 5–25)
CK SERPL-CCNC: 105 U/L (ref 39–308)
CO2 SERPL-SCNC: 29 MMOL/L (ref 21–32)
CO2 SERPL-SCNC: 29 MMOL/L (ref 21–32)
CREAT SERPL-MCNC: 0.83 MG/DL (ref 0.6–1.3)
CREAT SERPL-MCNC: 0.88 MG/DL (ref 0.6–1.3)
CREAT UR-MCNC: 65 MG/DL (ref 30–125)
ERYTHROCYTE [DISTWIDTH] IN BLOOD BY AUTOMATED COUNT: 12.3 % (ref 11.6–14.5)
GLOBULIN SER CALC-MCNC: 2.7 G/DL (ref 2–4)
GLUCOSE SERPL-MCNC: 103 MG/DL (ref 74–99)
GLUCOSE SERPL-MCNC: 105 MG/DL (ref 74–99)
HBA1C MFR BLD: 5 % (ref 4.2–5.6)
HCT VFR BLD AUTO: 32.3 % (ref 36–48)
HDLC SERPL-MCNC: 67 MG/DL (ref 40–60)
HDLC SERPL: 1.5 {RATIO} (ref 0–5)
HGB BLD-MCNC: 11.6 G/DL (ref 13–16)
INR PPP: 1.2 (ref 0.8–1.2)
LDLC SERPL CALC-MCNC: 23.4 MG/DL (ref 0–100)
LIPID PROFILE,FLP: ABNORMAL
MCH RBC QN AUTO: 35.3 PG (ref 24–34)
MCHC RBC AUTO-ENTMCNC: 35.9 G/DL (ref 31–37)
MCV RBC AUTO: 98.2 FL (ref 74–97)
OSMOLALITY SERPL: 271 MOSM/KG H2O (ref 280–301)
OSMOLALITY UR: 555 MOSM/KG H2O (ref 50–1400)
PLATELET # BLD AUTO: 238 K/UL (ref 135–420)
PMV BLD AUTO: 9.9 FL (ref 9.2–11.8)
POTASSIUM SERPL-SCNC: 4.4 MMOL/L (ref 3.5–5.5)
POTASSIUM SERPL-SCNC: 5 MMOL/L (ref 3.5–5.5)
PROT SERPL-MCNC: 5.8 G/DL (ref 6.4–8.2)
PROTHROMBIN TIME: 14.7 SEC (ref 11.5–15.2)
RBC # BLD AUTO: 3.29 M/UL (ref 4.7–5.5)
SODIUM SERPL-SCNC: 125 MMOL/L (ref 136–145)
SODIUM SERPL-SCNC: 126 MMOL/L (ref 136–145)
SODIUM UR-SCNC: 28 MMOL/L (ref 20–110)
TRIGL SERPL-MCNC: 48 MG/DL (ref ?–150)
TROPONIN I SERPL-MCNC: 0.04 NG/ML (ref 0–0.04)
TSH SERPL DL<=0.05 MIU/L-ACNC: 1.7 UIU/ML (ref 0.36–3.74)
VLDLC SERPL CALC-MCNC: 9.6 MG/DL
WBC # BLD AUTO: 10.1 K/UL (ref 4.6–13.2)

## 2019-08-09 PROCEDURE — C9113 INJ PANTOPRAZOLE SODIUM, VIA: HCPCS | Performed by: FAMILY MEDICINE

## 2019-08-09 PROCEDURE — 70551 MRI BRAIN STEM W/O DYE: CPT

## 2019-08-09 PROCEDURE — 65660000000 HC RM CCU STEPDOWN

## 2019-08-09 PROCEDURE — 85027 COMPLETE CBC AUTOMATED: CPT

## 2019-08-09 PROCEDURE — 77030040361 HC SLV COMPR DVT MDII -B

## 2019-08-09 PROCEDURE — 74011250636 HC RX REV CODE- 250/636: Performed by: FAMILY MEDICINE

## 2019-08-09 PROCEDURE — 80053 COMPREHEN METABOLIC PANEL: CPT

## 2019-08-09 PROCEDURE — 71045 X-RAY EXAM CHEST 1 VIEW: CPT

## 2019-08-09 PROCEDURE — 85610 PROTHROMBIN TIME: CPT

## 2019-08-09 PROCEDURE — 83036 HEMOGLOBIN GLYCOSYLATED A1C: CPT

## 2019-08-09 PROCEDURE — 93005 ELECTROCARDIOGRAM TRACING: CPT

## 2019-08-09 PROCEDURE — 84300 ASSAY OF URINE SODIUM: CPT

## 2019-08-09 PROCEDURE — 82550 ASSAY OF CK (CPK): CPT

## 2019-08-09 PROCEDURE — 80061 LIPID PANEL: CPT

## 2019-08-09 PROCEDURE — 83001 ASSAY OF GONADOTROPIN (FSH): CPT

## 2019-08-09 PROCEDURE — 74011250637 HC RX REV CODE- 250/637: Performed by: INTERNAL MEDICINE

## 2019-08-09 PROCEDURE — 83930 ASSAY OF BLOOD OSMOLALITY: CPT

## 2019-08-09 PROCEDURE — 83935 ASSAY OF URINE OSMOLALITY: CPT

## 2019-08-09 PROCEDURE — 84443 ASSAY THYROID STIM HORMONE: CPT

## 2019-08-09 PROCEDURE — 82570 ASSAY OF URINE CREATININE: CPT

## 2019-08-09 PROCEDURE — 36415 COLL VENOUS BLD VENIPUNCTURE: CPT

## 2019-08-09 PROCEDURE — 74011250637 HC RX REV CODE- 250/637: Performed by: FAMILY MEDICINE

## 2019-08-09 RX ORDER — LORAZEPAM 1 MG/1
1 TABLET ORAL
Status: DISCONTINUED | OUTPATIENT
Start: 2019-08-09 | End: 2019-08-12 | Stop reason: HOSPADM

## 2019-08-09 RX ORDER — LORAZEPAM 1 MG/1
2 TABLET ORAL
Status: DISCONTINUED | OUTPATIENT
Start: 2019-08-09 | End: 2019-08-12 | Stop reason: HOSPADM

## 2019-08-09 RX ORDER — IBUPROFEN 400 MG/1
800 TABLET ORAL
Status: DISCONTINUED | OUTPATIENT
Start: 2019-08-09 | End: 2019-08-12 | Stop reason: HOSPADM

## 2019-08-09 RX ORDER — LORAZEPAM 2 MG/ML
2 INJECTION INTRAMUSCULAR
Status: DISCONTINUED | OUTPATIENT
Start: 2019-08-09 | End: 2019-08-12 | Stop reason: HOSPADM

## 2019-08-09 RX ORDER — LORAZEPAM 2 MG/ML
1 INJECTION INTRAMUSCULAR
Status: DISCONTINUED | OUTPATIENT
Start: 2019-08-09 | End: 2019-08-12 | Stop reason: HOSPADM

## 2019-08-09 RX ORDER — OXYCODONE AND ACETAMINOPHEN 5; 325 MG/1; MG/1
2 TABLET ORAL
Status: DISCONTINUED | OUTPATIENT
Start: 2019-08-09 | End: 2019-08-09

## 2019-08-09 RX ORDER — SODIUM CHLORIDE 9 MG/ML
75 INJECTION, SOLUTION INTRAVENOUS CONTINUOUS
Status: DISCONTINUED | OUTPATIENT
Start: 2019-08-09 | End: 2019-08-09

## 2019-08-09 RX ORDER — CEPHALEXIN 250 MG/1
250 CAPSULE ORAL 4 TIMES DAILY
Status: DISCONTINUED | OUTPATIENT
Start: 2019-08-09 | End: 2019-08-12 | Stop reason: HOSPADM

## 2019-08-09 RX ORDER — LORAZEPAM 2 MG/ML
3 INJECTION INTRAMUSCULAR
Status: DISCONTINUED | OUTPATIENT
Start: 2019-08-09 | End: 2019-08-12 | Stop reason: HOSPADM

## 2019-08-09 RX ORDER — POLYETHYLENE GLYCOL 3350 17 G/17G
17 POWDER, FOR SOLUTION ORAL DAILY
Status: DISCONTINUED | OUTPATIENT
Start: 2019-08-09 | End: 2019-08-12 | Stop reason: HOSPADM

## 2019-08-09 RX ORDER — SODIUM CHLORIDE 0.9 % (FLUSH) 0.9 %
5-40 SYRINGE (ML) INJECTION EVERY 8 HOURS
Status: DISCONTINUED | OUTPATIENT
Start: 2019-08-09 | End: 2019-08-12 | Stop reason: HOSPADM

## 2019-08-09 RX ORDER — AMOXICILLIN 250 MG
2-4 CAPSULE ORAL
Status: DISCONTINUED | OUTPATIENT
Start: 2019-08-09 | End: 2019-08-12 | Stop reason: HOSPADM

## 2019-08-09 RX ORDER — SODIUM CHLORIDE 0.9 % (FLUSH) 0.9 %
5-40 SYRINGE (ML) INJECTION AS NEEDED
Status: DISCONTINUED | OUTPATIENT
Start: 2019-08-09 | End: 2019-08-12 | Stop reason: HOSPADM

## 2019-08-09 RX ADMIN — LORAZEPAM 1 MG: 1 TABLET ORAL at 21:09

## 2019-08-09 RX ADMIN — Medication 10 ML: at 15:00

## 2019-08-09 RX ADMIN — CEPHALEXIN 250 MG: 250 CAPSULE ORAL at 14:56

## 2019-08-09 RX ADMIN — CEPHALEXIN 250 MG: 250 CAPSULE ORAL at 21:09

## 2019-08-09 RX ADMIN — IBUPROFEN 800 MG: 400 TABLET ORAL at 14:56

## 2019-08-09 RX ADMIN — OXYCODONE HYDROCHLORIDE AND ACETAMINOPHEN 2 TABLET: 5; 325 TABLET ORAL at 09:23

## 2019-08-09 RX ADMIN — SODIUM CHLORIDE 75 ML/HR: 900 INJECTION, SOLUTION INTRAVENOUS at 04:06

## 2019-08-09 RX ADMIN — Medication 10 ML: at 09:19

## 2019-08-09 RX ADMIN — PANTOPRAZOLE SODIUM 40 MG: 40 INJECTION, POWDER, LYOPHILIZED, FOR SOLUTION INTRAVENOUS at 09:19

## 2019-08-09 RX ADMIN — CEPHALEXIN 250 MG: 250 CAPSULE ORAL at 18:48

## 2019-08-09 RX ADMIN — CEPHALEXIN 250 MG: 250 CAPSULE ORAL at 09:18

## 2019-08-09 RX ADMIN — OXYCODONE HYDROCHLORIDE AND ACETAMINOPHEN 2 TABLET: 5; 325 TABLET ORAL at 05:16

## 2019-08-09 RX ADMIN — Medication 10 ML: at 21:10

## 2019-08-09 NOTE — PROGRESS NOTES
2566 TRANSFER - IN REPORT:    Verbal report received from Nhung Rangel RN(name) on Constantino Waldron  being received from ED @ [facility](unit) for urgent transfer      Report consisted of patients Situation, Background, Assessment and   Recommendations(SBAR). Information from the following report(s) SBAR, Procedure Summary, Intake/Output, MAR and Recent Results was reviewed with the receiving nurse. Opportunity for questions and clarification was provided. Assessment completed upon patients arrival to unit and care assumed. 0216 Assessment completed and documented in flow sheet. Pt denies any further needs at this time. Pt in NAD with bed in low position, wheels locked and call bell within reach. Purposeful rounding completed. Pt resting quietly. No further needs voiced at this time. 3995 Purposeful rounding completed. Pt resting quietly. No further needs voiced at this time. 5680 call placed to Dr Batsheva Mata pt c/o leaking around rhino rocket. [facility] RN assisted with adding 2 ml of air to rhino rocket. Rhino rocket appears to be secure and in proper position. [address] with Dr Batsheva Mata concerning pt c/o rib pain with the hiccups and c/o congestion starting in the (R) nare. New orders received for percocet and saline spray. 0516 Pain medication administered as per PRN order for c/o pain. See flow sheets for follow up documentation. 0630 Purposeful rounding completed. Pt resting quietly. No further needs voiced at this time. 0715 Bedside and Verbal shift change report given to Tierra Cunningham RN (oncoming nurse) by Magi Haynes RN   (offgoing nurse). Report given with SBAR, Procedure Summary, MAR and Recent Results.

## 2019-08-09 NOTE — H&P
History & Physical    Patient: Eldon Peterson MRN: [id number]  CSN: [id number]    YOB: 1945  Age: 66 y.o. 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 I believe that his hyponatremia is due to alcohol consumption and HCTZ. -admit to [facility]  -[email or web address] cc/hr w/ q4 hr sodium checks (goal correction of no more than Na of 131 in 24 hours)  -hyponatremia and pituitary labs ordered  -stop eliquis  -brain MRI given orbital fracture and ER physician concern for pituitary issue causing hyponatremia  -stop antihypertensive for now given some hypotension in ED, will not continue HCTZ  -ENT consult for epistaxis and removal of rhino rocket  -nephro consult for hyponatremia  -fall risk, use walker for ambulation  -CIWA scale as he is at high risk for withdrawal  -keflex treatment for posterior nasal packing    Prophy-SCDs, protonix, no heparin SQ    Estimated length of stay : 3 nights    Sana Vizcarra MD  Nocturnist    -----------------------------------------------------------------------------------------------------------------------    CC: epistaxis       HPI:     Eldon Peterson is a 66 y.o. male who has had L hip pain and will get a hip replacement next month who has been using a cane for ambulation. He tripped 3 days ago on his sheets when going to the bathroom without injury. 2 days ago at lunchtime he tripped off a curb with his cane and landed on his face. He laceration was sutured and he was found to have a L orbital fracture. Yesterday, he was straining to have a BM and had an episode of epistaxis that wouldn't stop.  A rhino rocket was placed in the ER and labs showed asymptomatic hyponatremia with Na of 123. 6 months ago Na was normal. He reports being on his antihypertensive for more than a year. No new changes in diet/fluid intake. He was also found to be hypotensive in the ER to 70/40 possibly due to a vagal response. He denies CP/SOBN/V/D. He typically drinks 1 beer at lunch, 3 wine glasses in the evening, and a double shot of rum at bedtime and has been doing this for many years. No history of withdrawal but he has never had a time when he has been without alcohol. Past Medical History:   Diagnosis Date    Allergic rhinitis     Arrhythmia     afib    Arthritis     Cancer ([facility] [address]     prostate    Concussion     no loc but lose of memory after that event for that day    Hypertension 2016       Past Surgical History:   Procedure Laterality Date    HX HEENT      sinus opened    HX HERNIA REPAIR  age 13    right    HX ORTHOPAEDIC      cartilege removed from right knee    HX PROSTATECTOMY  2003    HX TONSILLECTOMY      HX VASECTOMY         History reviewed. No pertinent family history. Social History     Socioeconomic History    Marital status:      Spouse name: Not on file    Number of children: Not on file    Years of education: Not on file    Highest education level: Not on file   Tobacco Use    Smoking status: Former Smoker    Smokeless tobacco: Never Used    Tobacco comment: quit pipe in college   Substance and Sexual Activity    Alcohol use: Yes     Comment: 5 drinks daily=30    Drug use: No       Prior to Admission medications    Medication Sig Start Date End Date Taking? Authorizing Provider   polyethylene glycol (MIRALAX) 17 gram/dose powder Take 17 g by mouth daily. Landry Hunt MD   traMADol (ULTRAM) 50 mg tablet Take 50 mg by mouth every six (6) hours as needed for Pain. Landry Hunt MD   amoxicillin-clavulanate (AUGMENTIN) 875-125 mg per tablet Take  by mouth two (2) times a day.     Landry Hunt MD cephALEXin (KEFLEX) 500 mg capsule Take 1 Cap by mouth four (4) times daily for 7 days. 8/8/19 8/15/19  Sharron Melgar MD   naproxen (NAPROSYN) 500 mg tablet Take 500 mg by mouth two (2) times daily as needed for Pain. 2/10/19   Mauri Libman, MD   loratadine (CLARITIN) 10 mg tablet Take 10 mg by mouth daily as needed for Allergies. 1/29/19   Kris Bravo MD   sennosides (EX-LAX) 15 mg chewable tablet Take 2 Tabs by mouth daily as needed for Constipation. 1/29/19   Kris Bravo MD   senna-docusate (SENNA WITH DOCUSATE SODIUM) 8.6-50 mg per tablet Take 2-4 Tabs by mouth daily as needed for Constipation. 1/29/19   Kris Bravo MD   acetaminophen (TYLENOL) 500 mg tablet Take 1,000 mg by mouth every six (6) hours as needed for Pain. using in the place of Percocet 1/29/19   Kris Bravo MD   sildenafil citrate (VIAGRA) 50 mg tablet Take 50 mg by mouth daily as needed for Other (erectlile dysfunction). 1/29/19   Kris Bravo MD   bisacodyl (DULCOLAX) 10 mg suppository Insert 10 mg into rectum daily as needed for Other (constipation). 1/29/19   Kris Bravo MD   fluticasone (FLONASE) 50 mcg/actuation nasal spray 2 Sprays by Both Nostrils route daily. Provider, Historical   apixaban (ELIQUIS) 5 mg tablet Take 5 mg by mouth two (2) times a day. 12/26/18   Provider, Historical   therapeutic multivitamin (THERAGRAN) tablet Take 1 Tab by Mouth Once a Day. Provider, Historical   valsartan-hydroCHLOROthiazide (DIOVAN-HCT) 160-12.5 mg per tablet Take 1 Tab by Mouth Once a Day. 11/21/18   Provider, Historical       No Known Allergies    Review of Systems  Gen: No fever, chills, malaise, weight loss/gain. Heent: No headache, rhinorrhea, +epistaxis, no ear pain, hearing loss, sinus pain, neck pain/stiffness, sore throat. Heart: No chest pain, palpitations, SALCEDO, pnd, or orthopnea. Resp: No cough, hemoptysis, wheezing and shortness of breath.    GI: No nausea, vomiting, diarrhea, constipation, melena or hematochezia. : No urinary obstruction, dysuria or hematuria. Derm: No rash, new skin lesion or pruritis. Musc/skeletal: no bone or joint complaints. Vasc: No edema, cyanosis or claudication. Endo: No heat/cold intolerance, no polyuria,polydipsia or polyphagia. Neuro: No unilateral weakness, numbness, tingling. No seizures. Heme: +easy bruising or bleeding. Physical Exam:     Physical Exam:  Visit Vitals  /79   Pulse 81   Temp 97.5 °F (36.4 °C)   Resp 18   Wt 95.9 kg (211 lb 6.4 oz)   SpO2 99%   BMI 33.11 kg/m²      O2 Device: Room air    Temp (24hrs), Av.2 °F (36.8 °C), Min:97.5 °F (36.4 °C), Max:98.8 °F (37.1 °C)    No intake/output data recorded. No intake/output data recorded. General:  Awake, cooperative, no distress. Head:  Normocephalic, without obvious abnormality, atraumatic. Eyes:  Conjunctivae/corneas clear, sclera anicteric, PERRL, EOMs intact. Nose: Nares normal. No drainage or sinus tenderness. Throat: Lips, mucosa, and tongue normal.    Neck: Supple, symmetrical, trachea midline, no adenopathy. Lungs:   Clear to auscultation bilaterally. Heart:  Regular rate and rhythm, S1, S2 normal, no murmur, click, rub or gallop. Abdomen: Soft, non-tender. Bowel sounds normal. No masses,  No organomegaly. Extremities: Extremities normal, atraumatic, no cyanosis or edema. Capillary refill normal.   Pulses: 2+ and symmetric all extremities. Skin: Skin color pink/pale/mottled/flushed, turgor normal. No rashes or lesions   Neurologic: CNII-XII intact. No focal motor or sensory deficit. Labs Reviewed: All lab results for the last 24 hours reviewed.   Recent Results (from the past 24 hour(s))   CBC WITH AUTOMATED DIFF    Collection Time: 19  4:17 PM   Result Value Ref Range    WBC 12.0 (H) 4.1 - 11.1 K/uL    RBC 3.86 (L) 4.10 - 5.70 M/uL    HGB 13.9 12.1 - 17.0 g/dL    HCT 37.8 36.6 - 50.3 %    MCV 97.9 80.0 - 99.0 FL    MCH 36.0 (H) 26.0 - 34.0 PG    MCHC 36.8 (H) 30.0 - 36.5 g/dL    RDW 11.9 11.5 - 14.5 %    PLATELET 152 186 - 002 K/uL    MPV 10.1 8.9 - 12.9 FL    NRBC 0.0 0  WBC    ABSOLUTE NRBC 0.00 0.00 - 0.01 K/uL    NEUTROPHILS 79 (H) 32 - 75 %    LYMPHOCYTES 10 (L) 12 - 49 %    MONOCYTES 9 5 - 13 %    EOSINOPHILS 0 0 - 7 %    BASOPHILS 1 0 - 1 %    IMMATURE GRANULOCYTES 1 (H) 0.0 - 0.5 %    ABS. NEUTROPHILS 9.5 (H) 1.8 - 8.0 K/UL    ABS. LYMPHOCYTES 1.3 0.8 - 3.5 K/UL    ABS. MONOCYTES 1.1 (H) 0.0 - 1.0 K/UL    ABS. EOSINOPHILS 0.0 0.0 - 0.4 K/UL    ABS. BASOPHILS 0.1 0.0 - 0.1 K/UL    ABS. IMM.  GRANS. 0.1 (H) 0.00 - 0.04 K/UL    DF AUTOMATED     PROTHROMBIN TIME + INR    Collection Time: 08/08/19  4:17 PM   Result Value Ref Range    INR 1.1 0.9 - 1.1      Prothrombin time 11.0 9.0 - 72.0 sec   METABOLIC PANEL, BASIC    Collection Time: 08/08/19  4:17 PM   Result Value Ref Range    Sodium 122 (L) 136 - 145 mmol/L    Potassium 4.7 3.5 - 5.1 mmol/L    Chloride 85 (L) 97 - 108 mmol/L    CO2 31 21 - 32 mmol/L    Anion gap 6 5 - 15 mmol/L    Glucose 140 (H) 65 - 100 mg/dL    BUN 25 (H) 6 - 20 MG/DL    Creatinine 1.16 0.70 - 1.30 MG/DL    BUN/Creatinine ratio 22 (H) 12 - 20      GFR est AA >60 >60 ml/min/1.73m2    GFR est non-AA >60 >60 ml/min/1.73m2    Calcium 9.0 8.5 - 10.1 MG/DL   TYPE & SCREEN    Collection Time: 08/08/19  4:17 PM   Result Value Ref Range    Crossmatch Expiration 08/11/2019     ABO/Rh(D) O POSITIVE     Antibody screen NEG    METABOLIC PANEL, BASIC    Collection Time: 08/08/19  5:20 PM   Result Value Ref Range    Sodium 123 (L) 136 - 145 mmol/L    Potassium 4.4 3.5 - 5.1 mmol/L    Chloride 88 (L) 97 - 108 mmol/L    CO2 27 21 - 32 mmol/L    Anion gap 8 5 - 15 mmol/L    Glucose 123 (H) 65 - 100 mg/dL    BUN 26 (H) 6 - 20 MG/DL    Creatinine 0.95 0.70 - 1.30 MG/DL    BUN/Creatinine ratio 27 (H) 12 - 20      GFR est AA >60 >60 ml/min/1.73m2    GFR est non-AA >60 >60 ml/min/1.73m2    Calcium 8.4 (L) 8.5 - 10.1 MG/DL   EKG, 12 LEAD, INITIAL    Collection Time: 08/08/19  6:48 PM   Result Value Ref Range    Ventricular Rate 88 BPM    Atrial Rate 182 BPM    QRS Duration 84 ms    Q-T Interval 358 ms    QTC Calculation (Bezet) 433 ms    Calculated R Axis 17 degrees    Calculated T Axis -131 degrees    Diagnosis       Atrial fibrillation  ST & T wave abnormality, consider anterolateral ischemia  Abnormal ECG  No previous ECGs available     URINALYSIS W/ RFLX MICROSCOPIC    Collection Time: 08/08/19  8:00 PM   Result Value Ref Range    Color DARK YELLOW      Appearance CLEAR CLEAR      Specific gravity 1.015 1.003 - 1.030      pH (UA) 7.0 5.0 - 8.0      Protein 30 (A) NEG mg/dL    Glucose NEGATIVE  NEG mg/dL    Ketone 40 (A) NEG mg/dL    Blood NEGATIVE  NEG      Urobilinogen 1.0 0.2 - 1.0 EU/dL    Nitrites NEGATIVE  NEG      Leukocyte Esterase NEGATIVE  NEG     BILIRUBIN, CONFIRM    Collection Time: 08/08/19  8:00 PM   Result Value Ref Range    Bilirubin UA, confirm POSITIVE (A) NEG     URINE MICROSCOPIC ONLY    Collection Time: 08/08/19  8:00 PM   Result Value Ref Range    WBC 0-4 0 - 4 /hpf    RBC 0-5 0 - 5 /hpf    Epithelial cells FEW FEW /lpf    Bacteria NEGATIVE  NEG /hpf    Mucus 1+ /lpf    Hyaline cast 2-5 0 - 5 /lpf

## 2019-08-09 NOTE — PROGRESS NOTES
0752 Assumed care of patient from [facility] Saravanan Thomas Rn.     9449 Percocet 5-325mg 2 tabs prn given for complaints of right rib cage pain. See doc-flow sheet for follow up.    1456 Motrin 800 mg po prn given for complaints of generalized pain. See doc-flow sheet for follow up. [id number] Patient had a uneventful shift. Awaiting ENT consult, pt anxious about removing left nare rhino rocket, no nasal bleeding since admitted on unit. CIWA scoring less than 8 today, no ativan required for day shift. Continue to monitor sodium levels, strict I& O, fluid restriction 1,000 mL. Urine sample needed, pt experiencing loose stools unable to obtain urine specimen at this time.

## 2019-08-09 NOTE — PROGRESS NOTES
Bedside and Verbal shift change report given to LINDA Tejada (oncoming nurse) by Ny Garcia (offgoing nurse). Report included the following information SBAR, Kardex, Intake/Output, MAR and Recent Results.

## 2019-08-09 NOTE — PROGRESS NOTES
Reason for Admission:   Transferred from [facility] ER for epistaxis and hyponatremia                  RRAT Score:  14               Do you (patient/family) have any concerns for transition/discharge? TBD                Plan for utilizing home health:  TBD     Current Advanced Directive/Advance Care Plan: On file            Transition of Care Plan:   Chart reviewed and spoke with patient and wife at bedside,pt confused oriented to person only at this time, pt held bedside phone in his hand asking cm what it was, wife stated that pt does not do well with percocet he gets fidgety and restless/confused, wife stated that was not her husbands normal baseline, bedside nurse Carly Mcneill aware,cm also informed wife to notify staff with call light if pt needs get up to use restroom also notify staff when she leaves the room for pt safety,prior to admission pt independent with care, uses walking cane due to problems with his hip, wife states pt plan to have left hip surgery 10-18-19 with ,cm also informed that pt drinks 3 glasses of wine along with 1 beer daily,nephrology consulted for hyponatremia, pt als on CIWA monitoring, at this time no plan for discharge pt likely will remain over weekend, on call cm available for immediate needs. Care Management Interventions  PCP Verified by CM:  Yes  Palliative Care Criteria Met (RRAT>21 & CHF Dx)?: No  Current Support Network: Lives with Spouse

## 2019-08-09 NOTE — CONSULTS
Consult Note  Consult requested by: Dr Rosy Moctezuma is a 66 y.o. male [facility] who is being seen on consult for hyponatremia. No chief complaint on file. Admission diagnosis: epistaxis    HPI:  67 yo PMH HTN, etoh abuse, frequent falls most recently tripped and fell on his face who presented with epistaxis. He has left orbital fracture, Na 123 initially and was hypotensive. Repeat Na 125 today after some hydration, BP improved. he denies CP, SOB, N/V- appetite has been fine. He was on HCT at home.   Past Medical History:   Diagnosis Date    Allergic rhinitis     Arrhythmia     afib    Arthritis     Cancer ([facility] [address]     prostate    Concussion     no loc but lose of memory after that event for that day    Hypertension 2016     Past Surgical History:   Procedure Laterality Date    HX HEENT      sinus opened    HX HERNIA REPAIR  age 13    right    HX PROSTATECTOMY  2003    HX TONSILLECTOMY      HX VASECTOMY       Social History     Socioeconomic History    Marital status:      Spouse name: Not on file    Number of children: Not on file    Years of education: Not on file    Highest education level: Not on file   Occupational History    Not on file   Social Needs    Financial resource strain: Not on file    Food insecurity:     Worry: Not on file     Inability: Not on file    Transportation needs:     Medical: Not on file     Non-medical: Not on file   Tobacco Use    Smoking status: Former Smoker    Smokeless tobacco: Never Used    Tobacco comment: quit pipe in college   Substance and Sexual Activity    Alcohol use: Yes     Comment: 5 drinks daily=30    Drug use: No    Sexual activity: Not on file   Lifestyle    Physical activity:     Days per week: Not on file     Minutes per session: Not on file    Stress: Not on file   Relationships    Social connections:     Talks on phone: Not on file     Gets together: Not on file     Attends [employer] service: Not on file Active member of club or organization: Not on file     Attends meetings of clubs or organizations: Not on file     Relationship status: Not on file    Intimate partner violence:     Fear of current or ex partner: Not on file     Emotionally abused: Not on file     Physically abused: Not on file     Forced sexual activity: Not on file   Other Topics Concern     Service Not Asked    Blood Transfusions Not Asked    Caffeine Concern Not Asked    Occupational Exposure Not Asked    Hobby Hazards Not Asked    Sleep Concern Not Asked    Stress Concern Not Asked    Weight Concern Not Asked    Special Diet Not Asked    Back Care Not Asked    Exercise Not Asked    Bike Helmet Not Asked    Seat Belt Not Asked    Self-Exams Not Asked   Social History Narrative    Not on file       Family Hx:no hx kidney disease  No Known Allergies    Home Medications:     reviewed  Review of Systems:     Visit Vitals  /79   Pulse 81   Temp 97.5 °F (36.4 °C)   Resp 18   Wt 95.9 kg (211 lb 6.4 oz)   SpO2 99%   BMI 33.11 kg/m²       Gen: No fever, chills, malaise, weight loss/gain. Heent: No headache, rhinorrhea, +epistaxis, no ear pain, hearing loss, sinus pain, neck pain/stiffness, sore throat. Heart: No chest pain, palpitations, SALCEDO, pnd, or orthopnea. Resp: No cough, hemoptysis, wheezing and shortness of breath. GI: No nausea, vomiting, diarrhea, constipation, melena or hematochezia. : No urinary obstruction, dysuria or hematuria. Derm: No rash, new skin lesion or pruritis. Musc/skeletal: no bone or joint complaints. Vasc: No edema, cyanosis or claudication. Endo: No heat/cold intolerance, no polyuria,polydipsia or polyphagia. Neuro: No unilateral weakness, numbness, tingling. No seizures. Heme: +easy bruising or bleeding.     Physical Assessment:     Wt Readings from Last 3 Encounters:   08/09/19 95.9 kg (211 lb 6.4 oz)   08/08/19 94.6 kg (208 lb 9.6 oz)   01/28/19 90.3 kg (199 lb) Temp Readings from Last 3 Encounters:   08/09/19 97.5 °F (36.4 °C)   08/09/19 97.9 °F (36.6 °C)   02/11/19 97.6 °F (36.4 °C) (Temporal)     BP Readings from Last 3 Encounters:   08/09/19 115/79   08/09/19 110/67   02/11/19 (!) 144/96     Pulse Readings from Last 3 Encounters:   08/09/19 81   08/09/19 94   02/11/19 77      NAD  HEENT- Lt eye swelling, bruised  CV- RRR  Chest- clear  Abd- Soft, NTND +BS  Extremities- no edema  Neuro- A&Ox3, no focal deficits  Skin- no rash    WBC 10.1 H/H 11.6/32.3 plt 238  Na 125 K 4.4 Cl 88 CO2 29 BUN/Cr 37/0.8      Impression:   Hyponatremia- likely HCTZ, increased fluids  HTN  HTN- presented hypotensive  Etoh abuse  Mild anemia  Plan:   DC IVF  Diovan HCT held  Fluid restrict  Janice, Uosm, serum osm  Na BID for now  Ativan    Rocky Garcia MD  W- [phone]  S-[phone]

## 2019-08-10 LAB
ALBUMIN SERPL-MCNC: 3.4 G/DL (ref 3.4–5)
ALBUMIN/GLOB SERPL: 1.2 {RATIO} (ref 0.8–1.7)
ALP SERPL-CCNC: 60 U/L (ref 45–117)
ALT SERPL-CCNC: 30 U/L (ref 16–61)
ANION GAP SERPL CALC-SCNC: 7 MMOL/L (ref 3–18)
ANION GAP SERPL CALC-SCNC: 9 MMOL/L (ref 3–18)
ANION GAP SERPL CALC-SCNC: 9 MMOL/L (ref 3–18)
AST SERPL-CCNC: 27 U/L (ref 10–38)
ATRIAL RATE: 68 BPM
BILIRUB SERPL-MCNC: 0.8 MG/DL (ref 0.2–1)
BUN SERPL-MCNC: 24 MG/DL (ref 7–18)
BUN SERPL-MCNC: 28 MG/DL (ref 7–18)
BUN SERPL-MCNC: 30 MG/DL (ref 7–18)
BUN/CREAT SERPL: 29 (ref 12–20)
BUN/CREAT SERPL: 38 (ref 12–20)
BUN/CREAT SERPL: 39 (ref 12–20)
CALCIUM SERPL-MCNC: 8.3 MG/DL (ref 8.5–10.1)
CALCIUM SERPL-MCNC: 8.3 MG/DL (ref 8.5–10.1)
CALCIUM SERPL-MCNC: 8.4 MG/DL (ref 8.5–10.1)
CALCULATED R AXIS, ECG10: 17 DEGREES
CALCULATED T AXIS, ECG11: -178 DEGREES
CHLORIDE SERPL-SCNC: 89 MMOL/L (ref 100–111)
CHLORIDE SERPL-SCNC: 90 MMOL/L (ref 100–111)
CHLORIDE SERPL-SCNC: 91 MMOL/L (ref 100–111)
CO2 SERPL-SCNC: 26 MMOL/L (ref 21–32)
CO2 SERPL-SCNC: 27 MMOL/L (ref 21–32)
CO2 SERPL-SCNC: 28 MMOL/L (ref 21–32)
CREAT SERPL-MCNC: 0.71 MG/DL (ref 0.6–1.3)
CREAT SERPL-MCNC: 0.78 MG/DL (ref 0.6–1.3)
CREAT SERPL-MCNC: 0.84 MG/DL (ref 0.6–1.3)
DIAGNOSIS, 93000: NORMAL
ERYTHROCYTE [DISTWIDTH] IN BLOOD BY AUTOMATED COUNT: 12.3 % (ref 11.6–14.5)
FSH SERPL-ACNC: 3.4 MIU/ML
GLOBULIN SER CALC-MCNC: 2.8 G/DL (ref 2–4)
GLUCOSE SERPL-MCNC: 127 MG/DL (ref 74–99)
GLUCOSE SERPL-MCNC: 131 MG/DL (ref 74–99)
GLUCOSE SERPL-MCNC: 134 MG/DL (ref 74–99)
HCT VFR BLD AUTO: 30.9 % (ref 36–48)
HGB BLD-MCNC: 11.1 G/DL (ref 13–16)
LH SERPL-ACNC: 2 MIU/ML
MCH RBC QN AUTO: 34.7 PG (ref 24–34)
MCHC RBC AUTO-ENTMCNC: 35.9 G/DL (ref 31–37)
MCV RBC AUTO: 96.6 FL (ref 74–97)
OSMOLALITY UR: 604 MOSM/KG H2O (ref 50–1400)
PLATELET # BLD AUTO: 239 K/UL (ref 135–420)
PMV BLD AUTO: 9.5 FL (ref 9.2–11.8)
POTASSIUM SERPL-SCNC: 3.9 MMOL/L (ref 3.5–5.5)
POTASSIUM SERPL-SCNC: 4 MMOL/L (ref 3.5–5.5)
POTASSIUM SERPL-SCNC: 4.1 MMOL/L (ref 3.5–5.5)
PROT SERPL-MCNC: 6.2 G/DL (ref 6.4–8.2)
Q-T INTERVAL, ECG07: 428 MS
QRS DURATION, ECG06: 82 MS
QTC CALCULATION (BEZET), ECG08: 445 MS
RBC # BLD AUTO: 3.2 M/UL (ref 4.7–5.5)
SODIUM SERPL-SCNC: 124 MMOL/L (ref 136–145)
SODIUM SERPL-SCNC: 125 MMOL/L (ref 136–145)
SODIUM SERPL-SCNC: 127 MMOL/L (ref 136–145)
SODIUM UR-SCNC: 52 MMOL/L (ref 20–110)
VENTRICULAR RATE, ECG03: 65 BPM
WBC # BLD AUTO: 12.1 K/UL (ref 4.6–13.2)

## 2019-08-10 PROCEDURE — 97161 PT EVAL LOW COMPLEX 20 MIN: CPT

## 2019-08-10 PROCEDURE — C9113 INJ PANTOPRAZOLE SODIUM, VIA: HCPCS | Performed by: FAMILY MEDICINE

## 2019-08-10 PROCEDURE — 74011250637 HC RX REV CODE- 250/637: Performed by: INTERNAL MEDICINE

## 2019-08-10 PROCEDURE — 36415 COLL VENOUS BLD VENIPUNCTURE: CPT

## 2019-08-10 PROCEDURE — 65660000000 HC RM CCU STEPDOWN

## 2019-08-10 PROCEDURE — 74011250637 HC RX REV CODE- 250/637: Performed by: HOSPITALIST

## 2019-08-10 PROCEDURE — 74011250636 HC RX REV CODE- 250/636: Performed by: FAMILY MEDICINE

## 2019-08-10 PROCEDURE — 85027 COMPLETE CBC AUTOMATED: CPT

## 2019-08-10 PROCEDURE — 80048 BASIC METABOLIC PNL TOTAL CA: CPT

## 2019-08-10 PROCEDURE — 74011250637 HC RX REV CODE- 250/637: Performed by: FAMILY MEDICINE

## 2019-08-10 PROCEDURE — 80053 COMPREHEN METABOLIC PANEL: CPT

## 2019-08-10 RX ORDER — SODIUM CHLORIDE TAB 1 GM 1 G
1 TAB MISCELLANEOUS 2 TIMES DAILY WITH MEALS
Status: DISCONTINUED | OUTPATIENT
Start: 2019-08-10 | End: 2019-08-11

## 2019-08-10 RX ORDER — ASPIRIN 325 MG/1
100 TABLET, FILM COATED ORAL DAILY
Status: DISCONTINUED | OUTPATIENT
Start: 2019-08-10 | End: 2019-08-11

## 2019-08-10 RX ORDER — SODIUM BICARBONATE 650 MG/1
650 TABLET ORAL 2 TIMES DAILY
Status: DISCONTINUED | OUTPATIENT
Start: 2019-08-10 | End: 2019-08-10

## 2019-08-10 RX ADMIN — CEPHALEXIN 250 MG: 250 CAPSULE ORAL at 13:42

## 2019-08-10 RX ADMIN — SODIUM CHLORIDE TAB 1 GM 1 G: 1 TAB at 20:39

## 2019-08-10 RX ADMIN — PANTOPRAZOLE SODIUM 40 MG: 40 INJECTION, POWDER, LYOPHILIZED, FOR SOLUTION INTRAVENOUS at 09:12

## 2019-08-10 RX ADMIN — CEPHALEXIN 250 MG: 250 CAPSULE ORAL at 18:39

## 2019-08-10 RX ADMIN — Medication 100 MG: at 13:42

## 2019-08-10 RX ADMIN — CEPHALEXIN 250 MG: 250 CAPSULE ORAL at 09:12

## 2019-08-10 RX ADMIN — Medication 10 ML: at 07:06

## 2019-08-10 RX ADMIN — SODIUM BICARBONATE 650 MG TABLET 650 MG: at 09:12

## 2019-08-10 RX ADMIN — Medication 10 ML: at 13:44

## 2019-08-10 RX ADMIN — Medication 10 ML: at 23:16

## 2019-08-10 RX ADMIN — CEPHALEXIN 250 MG: 250 CAPSULE ORAL at 23:16

## 2019-08-10 NOTE — ROUTINE PROCESS
0700 Verbal and Written shift change report given to Gretel Ponce RN (oncoming nurse) by Crys Cruz. Italo Grimes RN (offgoing nurse). Report included the following information SBAR and Kardex.

## 2019-08-10 NOTE — CONSULTS
[address] [facility]    Name:  Louanna Saint  MR#:   [id number]  :  1945  ACCOUNT #:  [de-identified]  DATE OF SERVICE:  2019    ADMISSION DIAGNOSES:  1. Hyponatremia. 2.  Epistaxis. HISTORY OF PRESENT ILLNESS:  The patient is a 57-year-old with a past medical history significant for atrial fibrillation, treated with Eliquis. The patient also with a history of hypertension. The patient was transferred from [facility] here for difficulty with epistaxis and hyponatremia. Hyponatremia may be secondary to alcohol consumption as well as HCTZ. The patient was subsequently admitted. He was noted, however, to have some copious difficulties with epistaxis on the left side. This was packed at [facility]. He had some further bleeding. In the emergency room in [facility], addition pressure was applied to the packing. The patient is admitted at this time. Consultation requested for evaluation of hearing difficulties with epistaxis. PAST MEDICAL HISTORY:  Significant for allergic rhinitis, atrial fibrillation, arthritis, prostate cancer. The patient has had concussion as well as a previous history of a fall several days prior. The patient did have an orbital fracture and apparently has been evaluated for this. MEDICATIONS PRIOR TO ADMISSION:  Tramadol, MiraLax, Augmentin, Keflex, Naprosyn, Claritin, Ex-Lax, Tylenol, Viagra, Flonase, Theragran, and Diovan. ALLERGIES:  DENIED. PAST SURGICAL HISTORY:  Significant FESS, hernia repair, arthroscopic surgery, prostatectomy, tonsillectomy, vasectomy. SOCIAL HISTORY:  Noncontributory. The patient is . Denies any tobacco use, having quit at least 50 years prior. The patient does admit to five drinks per day. FAMILY HISTORY:  Noncontributory. REVIEW OF SYSTEMS:  Noncontributory. PHYSICAL EXAMINATION:  GENERAL:  Reveals a well-developed, well-nourished white male.   HEENT:  Intranasal exam reveals a significant packing. This does appear to be a Rapid Rhino. I believe this may be a 5.5 cm pack. This does appear to be in place without any evidence of any pathology. The right naris is totally unremarkable. Ear exam is within normal limits. Intraoral exam is totally unremarkable. I see no evidence of any obvious bleeding. IMPRESSION:  Epistaxis, left side. PLAN:  At this point, I have discussed the situation with the patient. I will be going out of the town in two days' time. I am uncertain the length of duration of the patient's hospitalization may be noted. I do feel it is premature to remove packing. He has only had this in place for 24 hours. He has just only recently discontinued his Eliquis. I would recommend his packing be in place for at least 3 days, possibly 5 days. Uncertain whether the patient may be discharged during that interval, so I would recommend the patient be seen in [facility] for packing removal.  This was discussed at length with the patient who understands and aware. The patient is presently on Keflex. He will maintain Keflex as well. He will follow up if any further difficulties arise.       Glenda Ford MD      [id number]  D:  08/09/2019 20:04  T:  08/09/2019 23:18  JOB #:  [id number]

## 2019-08-10 NOTE — PROGRESS NOTES
Problem: Mobility Impaired (Adult and Pediatric)  Goal: *Acute Goals and Plan of Care (Insert Text)  Description  Physical Therapy Goals  Initiated 8/10/2019 and to be accomplished within 7 day(s)  1. Patient will move from supine to sit and sit to supine  in bed with supervision/set-up. 2.  Patient will transfer from bed to chair and chair to bed with supervision/set-up using the least restrictive device. 3.  Patient will perform sit to stand with supervision/set-up. 4.  Patient will ambulate with supervision/set-up for 250 feet with the least restrictive device. 5.  Patient will ascend/descend 6 stairs with 1 handrail(s) with supervision/set-up. Outcome: Progressing Towards Goal   PHYSICAL THERAPY EVALUATION    Patient: Radha Lehman (17 y.o. male)  Date: 8/10/2019  Primary Diagnosis: Hyponatremia [E87.1]  Epistaxis [R04.0]        Precautions:   Fall    ASSESSMENT :  Based on the objective data described below, the patient presents with lower extremity weakness, decreased gait quality and endurance, impaired bed mobility and transfers, decreased JESSICA LE ROM/flexibility, and overall limitations in functional mobility s/p above Dx. Pt performed supine to sit with CGA, sit to stand with CGA. Patient ambulated 20 ft with SPC with CGA. Pt demo'd path deviations and minor LOB with turns while amb. Safety awareness cues provided throughout. Pt returned to supine in bed, all needs met, NAD, call bell within reach. Pt states difficulty with ADL's PTA, stating he had to shower at the wellness center bc it was a walk in shower and it is difficult to get into tub at home. Patient would benefit from skilled inpatient physical therapy to address deficits, progress as tolerated to achieve long term goals and allow safe discharge. .    Patient will benefit from skilled intervention to address the above impairments.   Patients rehabilitation potential is considered to be Good  Factors which may influence rehabilitation potential include:   ? None noted  ? Mental ability/status  ? Medical condition  ? Home/family situation and support systems  ? Safety awareness  ? Pain tolerance/management  ? Other:      PLAN :  Recommendations and Planned Interventions:  ?           Bed Mobility Training             ? Neuromuscular Re-Education  ? Transfer Training                   ? Orthotic/Prosthetic Training  ? Gait Training                          ? Modalities  ? Therapeutic Exercises          ? Edema Management/Control  ? Therapeutic Activities            ? Patient and Family Training/Education  ? Other (comment):    Frequency/Duration: Patient will be followed by physical therapy 1-2 times per day/4-7 days per week to address goals. Discharge Recommendations: Home Health  Further Equipment Recommendations for Discharge: shower chair     SUBJECTIVE:   Patient stated Avel Michael got this from a fall.     OBJECTIVE DATA SUMMARY:     Past Medical History:   Diagnosis Date    Allergic rhinitis     Arrhythmia     afib    Arthritis     Cancer ([facility] [address]     prostate    Concussion     no loc but lose of memory after that event for that day    Hypertension 2016     Past Surgical History:   Procedure Laterality Date    HX HEENT      sinus opened    HX HERNIA REPAIR  age 13    right    HX PROSTATECTOMY  2003    HX TONSILLECTOMY      HX VASECTOMY       Barriers to Learning/Limitations: None  Compensate with: visual, verbal, tactile, kinesthetic cues/model  Prior Level of Function/Home Situation: Pt states amb with SPC and needing assistance with ADL's PTA.   Home Situation  Home Environment: Private residence  # Steps to Enter: 6  Rails to Enter: Yes  Hand Rails : Bilateral  Wheelchair Ramp: No  One/Two Story Residence: One story  Living Alone: No  Support Systems: Spouse/Significant Other/Partner  Patient Expects to be Discharged to[de-identified] Private residence  Current DME Used/Available at Home: Sunitha Rivera, straight, Walker, rolling  Tub or Shower Type: Tub  Critical Behavior:  Neurologic State: Alert  Orientation Level: Oriented X4  Psychosocial  Purposeful Interaction: Yes  Pt Identified Daily Priority: Clinical issues (comment)  Caritas Process: Nurture loving kindness;Establish trust;Teaching/learning  Caring Interventions: Reassure  Reassure: Informing  Skin Condition/Temp: Warm;Dry   Skin Integrity: Laceration (comment)(over lt eye/ stitches present)  Skin Integumentary  Skin Color: Ecchymosis (comment)(lt eye bruising/ AUSTIN scattered small bruising)  Skin Condition/Temp: Warm;Dry  Skin Integrity: Laceration (comment)(over lt eye/ stitches present)   Strength:    Strength: Generally decreased, functional  Range Of Motion:  AROM: Generally decreased, functional  PROM: Generally decreased, functional  Functional Mobility:  Bed Mobility:   Supine to Sit: Contact guard assistance  Sit to Supine: Contact guard assistance  Scooting: Contact guard assistance  Transfers:  Sit to Stand: Contact guard assistance  Stand to Sit: Contact guard assistance  Balance:   Sitting: Intact; With support  Standing: Intact; With support  Ambulation/Gait Training:  Distance (ft): 20 Feet (ft)  Assistive Device: Cane, straight  Ambulation - Level of Assistance: Contact guard assistance   Gait Description (WDL): Exceptions to WDL  Gait Abnormalities: Decreased step clearance; Path deviations  Base of Support: Widened   Speed/Mila: Slow  Step Length: Right shortened;Left shortened  Pain:  Pain Scale 1: Numeric (0 - 10)  Pain Intensity 1: 0  Activity Tolerance:   good  Please refer to the flowsheet for vital signs taken during this treatment. After treatment:   ?         Patient left in no apparent distress sitting up in chair  ? Patient left in no apparent distress in bed  ? Call bell left within reach  ? Nursing notified  ?          Wife present  ? Bed alarm activated    COMMUNICATION/EDUCATION:   ?         Fall prevention education was provided and the patient/caregiver indicated understanding. ? Patient/family have participated as able in goal setting and plan of care. ?         Patient/family agree to work toward stated goals and plan of care. ?         Patient understands intent and goals of therapy, but is neutral about his/her participation. ? Patient is unable to participate in goal setting and plan of care.     Thank you for this referral.  Faina Grand   Time Calculation: 10 mins  Eval Complexity: History: LOW Complexity : Zero comorbidities / personal factors that will impact the outcome / POCExam:LOW Complexity : 1-2 Standardized tests and measures addressing body structure, function, activity limitation and / or participation in recreation  Presentation: LOW Complexity : Stable, uncomplicated  Clinical Decision Making:Low Complexity d/t mobility  Overall Complexity:LOW

## 2019-08-10 NOTE — PROGRESS NOTES
Patient had intermittent episodes of epistaxis in small amount. Will continue to monitor. Rhino rocket to left nare remain in place. ENT following.

## 2019-08-10 NOTE — PROGRESS NOTES
Hospitalist Progress Note    Patient: Nowell Severs MRN: [id number]  CSN: [id number]    YOB: 1945  Age: 66 y.o.   Sex: male    DOA: 8/9/2019 LOS:  LOS: 1 day          Chief Complaint:    hyponatremia      Assessment/Plan        65 y/o male with PMHx of afib without RVR on eliquis and HTN transferred from 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    ETOH abuse F10.10    Hyponatremia E87.1    Epistaxis R04.0       Subjective:  Denies new issue  Wants to go home  Although feels weak and \"bad\" this am        Review of systems:    Constitutional: denies fevers, chills  Respiratory: denies SOB  Cardiovascular: denies chest pain  Gastrointestinal: denies nausea, vomiting, diarrhea      Vital signs/Intake and Output:  Visit Vitals  /80 (BP 1 Location: Left arm, BP Patient Position: At rest;Supine)   Pulse 89   Temp 97.5 °F (36.4 °C)   Resp 18   Wt 94.2 kg (207 lb 9.6 oz)   SpO2 100%   BMI 32.51 kg/m²     Current Shift:  08/10 0701 - 08/10 1900  In: 120 [P.O.:120]  Out: 300 [Urine:300]  Last three shifts:  08/08 1901 - 08/10 0700  In: 1477.5 [P.O.:960; I.V.:517.5]  Out: 575 [Urine:575]    Exam:    General: elderly appearing Wm,  alert, NAD, OX3  Head/Neck: bruised left side of face, rhino packing left nare  CVS:Regular rate and rhythm, no M/R/G, S1/S2 heard, no thrill  Lungs:Clear to auscultation bilaterally, no wheezes, rhonchi, or rales  Abdomen: Soft, Nontender, No distention, Normal Bowel sounds, No hepatomegaly  Extremities: No C/C/E, pulses palpable 2+  Neuro:grossly normal , follows commands  Psych:appropriate                Labs: Results:       Chemistry Recent Labs     08/10/19  0230 08/09/19  0825 08/09/19  0442   * 103* 105*   * 125* 126*   K 4.0 4.4 5.0   CL 89* 88* 87*   CO2 26 29 29   BUN 30* 37* 40*   CREA 0.78 0.83 0.88   CA 8.4* 8.5 8.6   AGAP 9 8 10   BUCR 38* 45* 45*   AP 60  --  56   TP 6.2*  --  5.8*   ALB 3.4  --  3.1*   GLOB 2.8  --  2.7   AGRAT 1.2  --  1.1      CBC w/Diff Recent Labs     08/10/19  0230 08/09/19  0442 08/08/19  1617   WBC 12.1 10.1 12.0*   RBC 3.20* 3.29* 3.86*   HGB 11.1* 11.6* 13.9   HCT 30.9* 32.3* 37.8    238 269   GRANS  --   --  79*   LYMPH  --   --  10*   EOS  --   --  0      Cardiac Enzymes Recent Labs     08/09/19 0442      CKND1 7.0*      Coagulation Recent Labs     08/09/19 0442 08/08/19  1617   PTP 14.7 11.0   INR 1.2 1.1       Lipid Panel Lab Results   Component Value Date/Time    Cholesterol, total 100 08/09/2019 04:42 AM    HDL Cholesterol 67 (H) 08/09/2019 04:42 AM    LDL, calculated 23.4 08/09/2019 04:42 AM    VLDL, calculated 9.6 08/09/2019 04:42 AM    Triglyceride 48 08/09/2019 04:42 AM    CHOL/HDL Ratio 1.5 08/09/2019 04:42 AM      BNP No results for input(s): BNPP in the last 72 hours.    Liver Enzymes Recent Labs     08/10/19  0230   TP 6.2*   ALB 3.4   AP 60   SGOT 27      Thyroid Studies Lab Results   Component Value Date/Time    TSH 1.70 08/09/2019 04:42 AM        Procedures/imaging: see electronic medical records for all procedures/Xrays and details which were not copied into this note but were reviewed prior to creation of Kolby Valdez MD

## 2019-08-10 NOTE — PROGRESS NOTES
0700 CNA reported patients bloody nose from L nare. Nurse put 2cc air in rhino rocket to prevent bleeding.

## 2019-08-10 NOTE — PROGRESS NOTES
Problem: Falls - Risk of  Goal: *Absence of Falls  Description  Document Dorcas Veliz Fall Risk and appropriate interventions in the flowsheet.   Outcome: Progressing Towards Goal  Note:   Fall Risk Interventions:  Mobility Interventions: Assess mobility with egress test, Bed/chair exit alarm, Patient to call before getting OOB, PT Consult for mobility concerns         Medication Interventions: Bed/chair exit alarm, Patient to call before getting OOB, Teach patient to arise slowly    Elimination Interventions: Bed/chair exit alarm, Patient to call for help with toileting needs, Stay With Me (per policy), Toileting schedule/hourly rounds, Urinal in reach    History of Falls Interventions: Bed/chair exit alarm, Door open when patient unattended, Room close to nurse's station         Problem: Patient Education: Go to Patient Education Activity  Goal: Patient/Family Education  Outcome: Progressing Towards Goal

## 2019-08-10 NOTE — PHYSICIAN ADVISORY
Letter of Status Determination: Current Status INPATIENT is Appropriate Pt Name:  Homa Arce MR#  [id number] [facility]#   [id number] [facility]  360/01  @ [facility]  
Hospitalization date  8/9/2019  2:02 AM  
Current Attending Physician  Jamal Goss MD  
Principal diagnosis  Hyponatremia Epistaxis Clinicals  66 y.o. y.o  male hospitalized with Acute Hyponatremia and remains on IV fluids and close sodium monitoring every 8 hours. Pt is also watched closely for clinical signs and symptoms of alcohol withdrawal.  
This patient is at above high risk of deterioration based on documented presenting clinical data, comorbid conditions, high risk of adverse events and current acute care course. STATUS DETERMINATION  On the basis of clinical data, available documentation, we believe that the current status of this patient as INPATIENT is Appropriate Additional comments Insurance  [place]or: [place] MED[place]ARE / Plan: [place] MEDICARE PART A & B / Product Type: Medicare / Insurance Information [facility] PART A & B Phone:   
 Subscriber: Michael Cordero Subscriber#: [id number] Group#:  Precert#:   
   
 BLUE CROSS/[place] BLUE CROSS SUPPLEMENT MEDICARE Phone:   
 Subscriber: Michael Cordero Subscriber#: [id number] Group#: [id number] Precert#:   
  
 
  
 
 
Abimael Muñoz MD 
Physician Advisor [facility] [address] C:  
[email or web address] 
  
12:49 PM 8/10/2019

## 2019-08-10 NOTE — PROGRESS NOTES
Bedside and Verbal shift change report given to Fabricio Whiting (oncoming nurse) by [employer] (offgoing nurse). Report included the following information SBAR, Kardex, Intake/Output, MAR and Recent Results.

## 2019-08-10 NOTE — PROGRESS NOTES
Nephrology Progress Note    Subjective: This is a 67 yo male with a PMH of HTN, etoh abuse, frequent falls most recently tripped and fell on his face who presented with epistaxis. He has left orbital fracture, Na 123 initially and was hypotensive. Repeat Na 125 after some hydration, BP improved. he denies CP, SOB, N/V- appetite has been fine. He was on HCT at home.   Stable today, Na at 125 this AM.    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    ETOH abuse F10.10    Hyponatremia E87.1    Epistaxis R04.0     Current Facility-Administered Medications   Medication Dose Route Frequency    sodium bicarbonate tablet 650 mg  650 mg Oral BID    thiamine mononitrate (B-1) tablet 100 mg  100 mg Oral DAILY    polyethylene glycol (MIRALAX) packet 17 g  17 g Oral DAILY    senna-docusate (PERICOLACE) 8.6-50 mg per tablet 2-4 Tab  2-4 Tab Oral DAILY PRN    sodium chloride (NS) flush 5-40 mL  5-40 mL IntraVENous Q8H    sodium chloride (NS) flush 5-40 mL  5-40 mL IntraVENous PRN    LORazepam (ATIVAN) tablet 1 mg  1 mg Oral Q1H PRN    Or    LORazepam (ATIVAN) injection 1 mg  1 mg IntraVENous Q1H PRN    LORazepam (ATIVAN) tablet 2 mg  2 mg Oral Q1H PRN    Or    LORazepam (ATIVAN) injection 2 mg  2 mg IntraVENous Q1H PRN    LORazepam (ATIVAN) injection 3 mg  3 mg IntraVENous Q15MIN PRN    pantoprazole (PROTONIX) 40 mg in sodium chloride 0.9% 10 mL injection  40 mg IntraVENous DAILY    cephALEXin (KEFLEX) capsule 250 mg  250 mg Oral QID    sodium chloride (OCEAN) 0.65 % nasal squeeze bottle 2 Spray  2 Spray Right Nostril Q2H PRN    ibuprofen (MOTRIN) tablet 800 mg  800 mg Oral Q6H PRN       Allergy:   No Known Allergies     Objective:     Visit Vitals  /76 (BP 1 Location: Right arm)   Pulse 79   Temp 98.1 °F (36.7 °C)   Resp 18   Wt 94.2 kg (207 lb 9.6 oz)   SpO2 100%   BMI 32.51 kg/m²       Intake/Output Summary (Last 24 hours) at 8/10/2019 1929  Last data filed at 8/10/2019 1344  Gross per 24 hour   Intake 720 ml   Output 700 ml   Net 20 ml       Physical Exam:       General: No acute distress   HENT: Atraumatic and normocephalic   Eyes: Normal conjunctiva   Neck: Supple    Cardiovascular: Normal S1 & S2, no m/r/g   Pulmonary/Chest Wall: Clear to auscultation bilaterally   Abdominal: Soft and non-tender   Musculoskeletal: No edema LE bilaterally   Neurological: No focal deficits       Data Review:  Recent Labs     08/10/19  1207 08/10/19  0230   * 124*   K 3.9 4.0   CL 90* 89*   CO2 28 26   BUN 24* 30*   CREA 0.84 0.78   * 131*   CA 8.3* 8.4*     Recent Labs     08/10/19  0230 08/09/19  0442   WBC 12.1 10.1   HGB 11.1* 11.6*   HCT 30.9* 32.3*    238     Recent Labs     08/10/19  0230 08/09/19  0442   SGOT 27 30   AP 60 56   TP 6.2* 5.8*   ALB 3.4 3.1*   GLOB 2.8 2.7     Recent Labs     08/09/19  0442 08/08/19  1617   INR 1.2 1.1   PTP 14.7 11.0      No results for input(s): IRON, FE, TIBC, IBCT, PSAT, FERR in the last 72 hours.      Most recent labs: reviewed.       Impression:   Hyponatremia- likely secondary to HCTZ  HTN- presented hypotensive  Etoh abuse  Mild anemia  Epistaxis, ENT following    Plan:   Stay off IV fluid  Diovan HCT held  Cont Fluid restrict  Salt tablets bid  Following AM labs    Aby Harden 476  [phone]

## 2019-08-11 LAB
ANION GAP SERPL CALC-SCNC: 7 MMOL/L (ref 3–18)
ANION GAP SERPL CALC-SCNC: 7 MMOL/L (ref 3–18)
ANION GAP SERPL CALC-SCNC: 8 MMOL/L (ref 3–18)
BUN SERPL-MCNC: 20 MG/DL (ref 7–18)
BUN SERPL-MCNC: 21 MG/DL (ref 7–18)
BUN SERPL-MCNC: 25 MG/DL (ref 7–18)
BUN/CREAT SERPL: 27 (ref 12–20)
BUN/CREAT SERPL: 33 (ref 12–20)
BUN/CREAT SERPL: 34 (ref 12–20)
CALCIUM SERPL-MCNC: 8 MG/DL (ref 8.5–10.1)
CALCIUM SERPL-MCNC: 8.1 MG/DL (ref 8.5–10.1)
CALCIUM SERPL-MCNC: 8.2 MG/DL (ref 8.5–10.1)
CHLORIDE SERPL-SCNC: 91 MMOL/L (ref 100–111)
CHLORIDE SERPL-SCNC: 93 MMOL/L (ref 100–111)
CHLORIDE SERPL-SCNC: 97 MMOL/L (ref 100–111)
CO2 SERPL-SCNC: 25 MMOL/L (ref 21–32)
CO2 SERPL-SCNC: 26 MMOL/L (ref 21–32)
CO2 SERPL-SCNC: 27 MMOL/L (ref 21–32)
CREAT SERPL-MCNC: 0.64 MG/DL (ref 0.6–1.3)
CREAT SERPL-MCNC: 0.73 MG/DL (ref 0.6–1.3)
CREAT SERPL-MCNC: 0.74 MG/DL (ref 0.6–1.3)
ERYTHROCYTE [DISTWIDTH] IN BLOOD BY AUTOMATED COUNT: 12.4 % (ref 11.6–14.5)
GLUCOSE SERPL-MCNC: 107 MG/DL (ref 74–99)
GLUCOSE SERPL-MCNC: 173 MG/DL (ref 74–99)
GLUCOSE SERPL-MCNC: 93 MG/DL (ref 74–99)
HCT VFR BLD AUTO: 28.6 % (ref 36–48)
HGB BLD-MCNC: 10.2 G/DL (ref 13–16)
MCH RBC QN AUTO: 35.2 PG (ref 24–34)
MCHC RBC AUTO-ENTMCNC: 35.7 G/DL (ref 31–37)
MCV RBC AUTO: 98.6 FL (ref 74–97)
PLATELET # BLD AUTO: 238 K/UL (ref 135–420)
PMV BLD AUTO: 9.8 FL (ref 9.2–11.8)
POTASSIUM SERPL-SCNC: 4 MMOL/L (ref 3.5–5.5)
POTASSIUM SERPL-SCNC: 4.1 MMOL/L (ref 3.5–5.5)
POTASSIUM SERPL-SCNC: 4.3 MMOL/L (ref 3.5–5.5)
RBC # BLD AUTO: 2.9 M/UL (ref 4.7–5.5)
SODIUM SERPL-SCNC: 125 MMOL/L (ref 136–145)
SODIUM SERPL-SCNC: 126 MMOL/L (ref 136–145)
SODIUM SERPL-SCNC: 130 MMOL/L (ref 136–145)
WBC # BLD AUTO: 9.8 K/UL (ref 4.6–13.2)

## 2019-08-11 PROCEDURE — 74011250637 HC RX REV CODE- 250/637: Performed by: HOSPITALIST

## 2019-08-11 PROCEDURE — 85027 COMPLETE CBC AUTOMATED: CPT

## 2019-08-11 PROCEDURE — 36415 COLL VENOUS BLD VENIPUNCTURE: CPT

## 2019-08-11 PROCEDURE — 74011250637 HC RX REV CODE- 250/637: Performed by: FAMILY MEDICINE

## 2019-08-11 PROCEDURE — 74011250637 HC RX REV CODE- 250/637: Performed by: INTERNAL MEDICINE

## 2019-08-11 PROCEDURE — C9113 INJ PANTOPRAZOLE SODIUM, VIA: HCPCS | Performed by: HOSPITALIST

## 2019-08-11 PROCEDURE — 97116 GAIT TRAINING THERAPY: CPT

## 2019-08-11 PROCEDURE — 80048 BASIC METABOLIC PNL TOTAL CA: CPT

## 2019-08-11 PROCEDURE — 65660000000 HC RM CCU STEPDOWN

## 2019-08-11 PROCEDURE — 74011250636 HC RX REV CODE- 250/636: Performed by: FAMILY MEDICINE

## 2019-08-11 PROCEDURE — C9113 INJ PANTOPRAZOLE SODIUM, VIA: HCPCS | Performed by: FAMILY MEDICINE

## 2019-08-11 PROCEDURE — 74011000250 HC RX REV CODE- 250: Performed by: HOSPITALIST

## 2019-08-11 PROCEDURE — 74011250636 HC RX REV CODE- 250/636: Performed by: HOSPITALIST

## 2019-08-11 RX ORDER — ONDANSETRON 2 MG/ML
4 INJECTION INTRAMUSCULAR; INTRAVENOUS
Status: DISCONTINUED | OUTPATIENT
Start: 2019-08-11 | End: 2019-08-12 | Stop reason: HOSPADM

## 2019-08-11 RX ORDER — IPRATROPIUM BROMIDE 21 UG/1
2 SPRAY, METERED NASAL 4 TIMES DAILY
Status: DISCONTINUED | OUTPATIENT
Start: 2019-08-11 | End: 2019-08-12 | Stop reason: HOSPADM

## 2019-08-11 RX ORDER — SODIUM CHLORIDE TAB 1 GM 1 G
1 TAB MISCELLANEOUS
Status: DISCONTINUED | OUTPATIENT
Start: 2019-08-11 | End: 2019-08-12 | Stop reason: HOSPADM

## 2019-08-11 RX ADMIN — CEPHALEXIN 250 MG: 250 CAPSULE ORAL at 17:14

## 2019-08-11 RX ADMIN — SODIUM CHLORIDE TAB 1 GM 1 G: 1 TAB at 09:03

## 2019-08-11 RX ADMIN — FOLIC ACID: 5 INJECTION, SOLUTION INTRAMUSCULAR; INTRAVENOUS; SUBCUTANEOUS at 14:03

## 2019-08-11 RX ADMIN — SODIUM CHLORIDE 500 ML: 900 INJECTION, SOLUTION INTRAVENOUS at 12:12

## 2019-08-11 RX ADMIN — CEPHALEXIN 250 MG: 250 CAPSULE ORAL at 12:14

## 2019-08-11 RX ADMIN — IBUPROFEN 800 MG: 400 TABLET ORAL at 22:17

## 2019-08-11 RX ADMIN — CEPHALEXIN 250 MG: 250 CAPSULE ORAL at 22:17

## 2019-08-11 RX ADMIN — Medication 10 ML: at 12:14

## 2019-08-11 RX ADMIN — PANTOPRAZOLE SODIUM 40 MG: 40 INJECTION, POWDER, LYOPHILIZED, FOR SOLUTION INTRAVENOUS at 22:17

## 2019-08-11 RX ADMIN — CEPHALEXIN 250 MG: 250 CAPSULE ORAL at 09:03

## 2019-08-11 RX ADMIN — PANTOPRAZOLE SODIUM 40 MG: 40 INJECTION, POWDER, LYOPHILIZED, FOR SOLUTION INTRAVENOUS at 09:03

## 2019-08-11 RX ADMIN — IPRATROPIUM BROMIDE 2 SPRAY: 21 SPRAY NASAL at 12:14

## 2019-08-11 RX ADMIN — SODIUM CHLORIDE TAB 1 GM 1 G: 1 TAB at 17:14

## 2019-08-11 RX ADMIN — SALINE NASAL SPRAY 2 SPRAY: 1.5 SOLUTION NASAL at 22:17

## 2019-08-11 RX ADMIN — Medication 100 MG: at 09:03

## 2019-08-11 RX ADMIN — Medication 10 ML: at 22:18

## 2019-08-11 RX ADMIN — SODIUM CHLORIDE TAB 1 GM 1 G: 1 TAB at 12:13

## 2019-08-11 RX ADMIN — Medication 10 ML: at 09:04

## 2019-08-11 RX ADMIN — IPRATROPIUM BROMIDE 2 SPRAY: 21 SPRAY NASAL at 17:13

## 2019-08-11 RX ADMIN — IPRATROPIUM BROMIDE 2 SPRAY: 21 SPRAY NASAL at 22:19

## 2019-08-11 NOTE — PROGRESS NOTES
Bedside and Verbal shift change report given to JoannLukas [place] (oncoming nurse) by Colleen Fish (offgoing nurse). Report included the following information SBAR, Kardex, Intake/Output, MAR and Recent Results. No nose bleed for days shift or none reported overnight.     Continue to monitor sodium levels    Daily banana bag infusing    NaCl tablets three times a day    Nasal spray for nasal congestion

## 2019-08-11 NOTE — PROGRESS NOTES
Hospitalist Progress Note    Patient: Constantino Waldron MRN: [id number]  CSN: [id number]    YOB: 1945  Age: 66 y.o. Sex: male    DOA: 8/9/2019 LOS:  LOS: 2 days          Chief Complaint:    hyponatremia      Assessment/Plan     67 y/o male with PMHx of afib without RVR on eliquis and HTN transferred from [facility] ER for epistaxis and hyponatremia     Epistaxis-packing to remain until beginning of the week per ENT-probably remove tomorrow-continue keflex also  eliquis on hold till then    Hyponatremia-no real improvement since yesterday-poor po intake-vomited this am-will give banana bag as suspect some degree of etoh w/d and give IV NS bolus also    Falls-with left facial contusion  PT eval/consult appreciated    Chronic afib    etoh use-we have discussed this with wife also this am-he drinks at least 6 drinks a day-we may be seeing some etoh withdrawal and hyponatremia related to etoh abuse-counselled cessation and health issues further that will deteriorate with continued etoh use-add banana bag and monitor on telemetry    Continue BMP every 8 hrs  Orders as above  zofran PRN nausea  IV protonix    Not ready for d/c yet     Disposition :1-2 days, N  Patient Active Problem List   Diagnosis Code    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    ETOH abuse F10.10    Hyponatremia E87.1    Epistaxis R04.0       Subjective:  Sick this am  Nauseated, vomited while I was in room    Ate 2 muffins prior to this     Did not eat much of anything yesterday  Feeling weak still      Review of systems:    Constitutional: denies myalgias  Respiratory: denies SOB  Cardiovascular: denies chest pain, palpitations        Vital signs/Intake and Output:  Visit Vitals  /78 (BP 1 Location: Left arm, BP Patient Position: At rest)   Pulse 72   Temp 98.4 °F (36.9 °C)   Resp 18   Wt 92.8 kg (204 lb 9.4 oz)   SpO2 98%   BMI 32.04 kg/m²     Current Shift:  No intake/output data recorded.   Last three shifts:  08/09 1901 - 08/11 0700  In: 720 [P.O.:720]  Out: 700 [Urine:700]    Exam:    General: elderly WM, alert, NAD, OX3  Head/Neck: left facial bruising, cut above left eye, left nasal packing  CVS:irreg rhythm, reg rate no M/R/G, S1/S2 heard, no thrill  Lungs:Clear to auscultation bilaterally, no wheezes, rhonchi, or rales  Abdomen: Soft, Nontender, No distention, Normal Bowel sounds, No hepatomegaly  Extremities: No C/C/E, pulses palpable 2+  Neuro:grossly normal , follows commands  Psych:appropriate                Labs: Results:       Chemistry Recent Labs     08/11/19  0450 08/10/19  2035 08/10/19  1207 08/10/19  0230  08/09/19  0442   * 134* 127* 131*   < > 105*   * 127* 125* 124*   < > 126*   K 4.3 4.1 3.9 4.0   < > 5.0   CL 91* 91* 90* 89*   < > 87*   CO2 27 27 28 26   < > 29   BUN 25* 28* 24* 30*   < > 40*   CREA 0.73 0.71 0.84 0.78   < > 0.88   CA 8.1* 8.3* 8.3* 8.4*   < > 8.6   AGAP 7 9 7 9   < > 10   BUCR 34* 39* 29* 38*   < > 45*   AP  --   --   --  60  --  56   TP  --   --   --  6.2*  --  5.8*   ALB  --   --   --  3.4  --  3.1*   GLOB  --   --   --  2.8  --  2.7   AGRAT  --   --   --  1.2  --  1.1    < > = values in this interval not displayed.       CBC w/Diff Recent Labs     08/11/19  0450 08/10/19  0230 08/09/19  0442 08/08/19  1617   WBC 9.8 12.1 10.1 12.0*   RBC 2.90* 3.20* 3.29* 3.86*   HGB 10.2* 11.1* 11.6* 13.9   HCT 28.6* 30.9* 32.3* 37.8    239 238 269   GRANS  --   --   --  79*   LYMPH  --   --   --  10*   EOS  --   --   --  0      Cardiac Enzymes Recent Labs     08/09/19 0442      CKND1 7.0*      Coagulation Recent Labs     08/09/19 0442 08/08/19  1617   PTP 14.7 11.0   INR 1.2 1.1       Lipid Panel Lab Results   Component Value Date/Time    Cholesterol, total 100 08/09/2019 04:42 AM    HDL Cholesterol 67 (H) 08/09/2019 04:42 AM    LDL, calculated 23.4 08/09/2019 04:42 AM    VLDL, calculated 9.6 08/09/2019 04:42 AM    Triglyceride 48 08/09/2019 04:42 AM CHOL/HDL Ratio 1.5 08/09/2019 04:42 AM      BNP No results for input(s): BNPP in the last 72 hours.    Liver Enzymes Recent Labs     08/10/19  0230   TP 6.2*   ALB 3.4   AP 60   SGOT 27      Thyroid Studies Lab Results   Component Value Date/Time    TSH 1.70 08/09/2019 04:42 AM        Procedures/imaging: see electronic medical records for all procedures/Xrays and details which were not copied into this note but were reviewed prior to creation of Willam Fregoso MD

## 2019-08-11 NOTE — PROGRESS NOTES
Shift uneventful. 0730 Bedside and Verbal shift change report given to Kita Zuniga RN (oncoming nurse) by Luther Beasley RN   (offgoing nurse). Report included the following information SBAR, Kardex, OR Summary, Procedure Summary, MAR, Accordion, Recent Results and Med Rec Status.

## 2019-08-11 NOTE — PROGRESS NOTES
Problem: Mobility Impaired (Adult and Pediatric)  Goal: *Acute Goals and Plan of Care (Insert Text)  Description  Physical Therapy Goals  Initiated 8/10/2019 and to be accomplished within 7 day(s)  1. Patient will move from supine to sit and sit to supine  in bed with supervision/set-up. 2.  Patient will transfer from bed to chair and chair to bed with supervision/set-up using the least restrictive device. 3.  Patient will perform sit to stand with supervision/set-up. 4.  Patient will ambulate with supervision/set-up for 250 feet with the least restrictive device. 5.  Patient will ascend/descend 6 stairs with 1 handrail(s) with supervision/set-up. Outcome: Progressing Towards Goal   PHYSICAL THERAPY TREATMENT    Patient: Tana Rudolph (55 y.o. male)  Date: 8/11/2019  Diagnosis: Hyponatremia [E87.1]  Epistaxis [R04.0] Hyponatremia       Precautions: Fall   Chart, physical therapy assessment, plan of care and goals were reviewed. ASSESSMENT:  Pt supine in bed, willing to participate, wife present. Pt completed bed mob with SBA. Improved tolerance to PT today. Pt amb with RW for increased stability, CGA. Narrow JAM and minor path deviations noted but no LOB during amb. Pt req VC and TC for RW sequencing with 75% carryover. Pt returned to room, sitting EOB, all needs met, NAD, wife present, call bell within reach. Progression toward goals:  ?      Improving appropriately and progressing toward goals  ? Improving slowly and progressing toward goals  ? Not making progress toward goals and plan of care will be adjusted     PLAN:  Patient continues to benefit from skilled intervention to address the above impairments. Continue treatment per established plan of care. Discharge Recommendations:  Home Health  Further Equipment Recommendations for Discharge:  N/A     SUBJECTIVE:   Patient stated I got sick earlier.     OBJECTIVE DATA SUMMARY:   Critical Behavior:  Neurologic State: Alert  Orientation Level: Oriented X4        Functional Mobility Training:  Bed Mobility:   Supine to Sit: Stand-by assistance  Sit to Supine: Stand-by assistance  Scooting: Stand-by assistance  Transfers:  Sit to Stand: Contact guard assistance  Stand to Sit: Stand-by assistance  Balance:  Sitting: Intact; With support  Standing: Intact; With support  Ambulation/Gait Training:  Distance (ft): 100 Feet (ft)  Assistive Device: Gait belt;Walker, rolling  Ambulation - Level of Assistance: Contact guard assistance  Gait Abnormalities: Decreased step clearance; Path deviations  Base of Support: Narrowed   Speed/Mila: Slow  Step Length: Right shortened;Left shortened  Pain:  Pain Scale 1: Numeric (0 - 10)  Pain Intensity 1: 0  Activity Tolerance:   good  Please refer to the flowsheet for vital signs taken during this treatment. After treatment:   ? Patient left in no apparent distress sitting up in chair  ? Patient left in no apparent distress in bed, EOB  ? Call bell left within reach  ? Nursing notified  ? Wife present  ?  Bed alarm activated      Jeanette Young   Time Calculation: 15 mins

## 2019-08-11 NOTE — PROGRESS NOTES
Nephrology Progress Note    Subjective: This is a 65 yo white male with a PMH of HTN, etoh abuse, frequent falls most recently tripped and fell on his face who presented with epistaxis. He has left orbital fracture, Na 123 initially and was hypotensive. Repeat Na 125 after some hydration, BP improved. he denies CP, SOB, N/V- appetite has been fine. He was on HCT at home. Started on salt tablets and placed hctz on hold. Cr only up to 126 today. Pt has experienced some n/v earlier today, started on iv ns/ banana bag. Currently has no dizziness, cp or sob. 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 4 with vit K 10 mL, thiamine 556 mg, folic acid 1 mg infusion   IntraVENous DAILY    ondansetron (ZOFRAN) injection 4 mg  4 mg IntraVENous Q6H PRN    pantoprazole (PROTONIX) 40 mg in sodium chloride 0.9% 10 mL injection  40 mg IntraVENous Q12H    ipratropium (ATROVENT) 0.03 % nasal spray 2 Spray  2 Spray Right Nostril QID    polyethylene glycol (MIRALAX) packet 17 g  17 g Oral DAILY    senna-docusate (PERICOLACE) 8.6-50 mg per tablet 2-4 Tab  2-4 Tab Oral DAILY PRN    sodium chloride (NS) flush 5-40 mL  5-40 mL IntraVENous Q8H    sodium chloride (NS) flush 5-40 mL  5-40 mL IntraVENous PRN    LORazepam (ATIVAN) tablet 1 mg  1 mg Oral Q1H PRN    Or    LORazepam (ATIVAN) injection 1 mg  1 mg IntraVENous Q1H PRN    LORazepam (ATIVAN) tablet 2 mg  2 mg Oral Q1H PRN    Or    LORazepam (ATIVAN) injection 2 mg  2 mg IntraVENous Q1H PRN    LORazepam (ATIVAN) injection 3 mg  3 mg IntraVENous Q15MIN PRN    cephALEXin (KEFLEX) capsule 250 mg  250 mg Oral QID    sodium chloride (OCEAN) 0.65 % nasal squeeze bottle 2 Spray  2 Spray Right Nostril Q2H PRN    ibuprofen (MOTRIN) tablet 800 mg  800 mg Oral Q6H PRN       Allergy:   No Known Allergies     Objective:     Visit Vitals  /83 (BP 1 Location: Right arm, BP Patient Position: At rest)   Pulse 77   Temp 98.1 °F (36.7 °C)   Resp 16   Wt 92.8 kg (204 lb 9.4 oz)   SpO2 100%   BMI 32.04 kg/m²       Intake/Output Summary (Last 24 hours) at 8/11/2019 1832  Last data filed at 8/11/2019 1743  Gross per 24 hour   Intake 1438.34 ml   Output    Net 1438.34 ml       Physical Exam:       General: No acute distress   HENT: Atraumatic and normocephalic, nose packed   Eyes: Normal conjunctiva   Neck: Supple or no JVD   Cardiovascular: Normal S1 & S2, no m/r/g   Pulmonary/Chest Wall: Clear to auscultation bilaterally   Abdominal: Soft and non-tender   Musculoskeletal: No edema LE bilaterally   Neurological: No focal deficits       Data Review:  Recent Labs     08/11/19  1209 08/11/19  0450   * 125*   K 4.0 4.3   CL 93* 91*   CO2 26 27   BUN 21* 25*   CREA 0.64 0.73   GLU 93 107*   CA 8.2* 8.1*     Recent Labs     08/11/19  0450 08/10/19  0230   WBC 9.8 12.1   HGB 10.2* 11.1*   HCT 28.6* 30.9*    239     Recent Labs     08/10/19  0230 08/09/19  0442   SGOT 27 30   AP 60 56   TP 6.2* 5.8*   ALB 3.4 3.1*   GLOB 2.8 2.7     Recent Labs     08/09/19  0442   INR 1.2   PTP 14.7      No results for input(s): IRON, FE, TIBC, IBCT, PSAT, FERR in the last 72 hours. Most recent labs: reviewed.       Impression:   Hyponatremia- appears more hypovolumic today. HCTZ placed on hold. TSH normal.  H.o.  HTN, presented with hypotension  Etoh abuse  Mild anemia  Epistaxis, ENT following    Plan:   Agree with restarting IV NS, monitor Na level closely throughout the day  Diovan HCT held  Increase Salt tablets to tid  Following AM labs  D/w pt    Cynthia St MD  [facility]  [phone]

## 2019-08-12 ENCOUNTER — HOME HEALTH ADMISSION (OUTPATIENT)
Dept: HOME HEALTH SERVICES | Facility: HOME HEALTH | Age: 74
End: 2019-08-12

## 2019-08-12 VITALS
RESPIRATION RATE: 18 BRPM | OXYGEN SATURATION: 100 % | TEMPERATURE: 98.2 F | BODY MASS INDEX: 32.75 KG/M2 | WEIGHT: 209.1 LBS | DIASTOLIC BLOOD PRESSURE: 75 MMHG | HEART RATE: 66 BPM | SYSTOLIC BLOOD PRESSURE: 114 MMHG

## 2019-08-12 LAB
ANION GAP SERPL CALC-SCNC: 6 MMOL/L (ref 3–18)
ATRIAL RATE: 59 BPM
BUN SERPL-MCNC: 19 MG/DL (ref 7–18)
BUN/CREAT SERPL: 26 (ref 12–20)
CALCIUM SERPL-MCNC: 8.1 MG/DL (ref 8.5–10.1)
CALCULATED R AXIS, ECG10: 64 DEGREES
CALCULATED T AXIS, ECG11: -106 DEGREES
CHLORIDE SERPL-SCNC: 98 MMOL/L (ref 100–111)
CO2 SERPL-SCNC: 27 MMOL/L (ref 21–32)
CREAT SERPL-MCNC: 0.74 MG/DL (ref 0.6–1.3)
DIAGNOSIS, 93000: NORMAL
ERYTHROCYTE [DISTWIDTH] IN BLOOD BY AUTOMATED COUNT: 12.6 % (ref 11.6–14.5)
GLUCOSE SERPL-MCNC: 120 MG/DL (ref 74–99)
HCT VFR BLD AUTO: 27.3 % (ref 36–48)
HGB BLD-MCNC: 9.7 G/DL (ref 13–16)
MCH RBC QN AUTO: 34.9 PG (ref 24–34)
MCHC RBC AUTO-ENTMCNC: 35.5 G/DL (ref 31–37)
MCV RBC AUTO: 98.2 FL (ref 74–97)
PLATELET # BLD AUTO: 216 K/UL (ref 135–420)
PMV BLD AUTO: 9.3 FL (ref 9.2–11.8)
POTASSIUM SERPL-SCNC: 4.6 MMOL/L (ref 3.5–5.5)
Q-T INTERVAL, ECG07: 410 MS
QRS DURATION, ECG06: 84 MS
QTC CALCULATION (BEZET), ECG08: 463 MS
RBC # BLD AUTO: 2.78 M/UL (ref 4.7–5.5)
SODIUM SERPL-SCNC: 131 MMOL/L (ref 136–145)
VENTRICULAR RATE, ECG03: 77 BPM
WBC # BLD AUTO: 8.4 K/UL (ref 4.6–13.2)

## 2019-08-12 PROCEDURE — 74011000250 HC RX REV CODE- 250: Performed by: HOSPITALIST

## 2019-08-12 PROCEDURE — 74011250636 HC RX REV CODE- 250/636: Performed by: HOSPITALIST

## 2019-08-12 PROCEDURE — 93005 ELECTROCARDIOGRAM TRACING: CPT

## 2019-08-12 PROCEDURE — 85027 COMPLETE CBC AUTOMATED: CPT

## 2019-08-12 PROCEDURE — 80048 BASIC METABOLIC PNL TOTAL CA: CPT

## 2019-08-12 PROCEDURE — 74011250637 HC RX REV CODE- 250/637: Performed by: FAMILY MEDICINE

## 2019-08-12 PROCEDURE — 36415 COLL VENOUS BLD VENIPUNCTURE: CPT

## 2019-08-12 PROCEDURE — 97116 GAIT TRAINING THERAPY: CPT

## 2019-08-12 PROCEDURE — 74011250637 HC RX REV CODE- 250/637: Performed by: HOSPITALIST

## 2019-08-12 PROCEDURE — C9113 INJ PANTOPRAZOLE SODIUM, VIA: HCPCS | Performed by: HOSPITALIST

## 2019-08-12 RX ORDER — VALSARTAN 160 MG/1
160 TABLET ORAL DAILY
Qty: 30 TAB | Refills: 0 | Status: SHIPPED | OUTPATIENT
Start: 2019-08-12

## 2019-08-12 RX ORDER — LANOLIN ALCOHOL/MO/W.PET/CERES
100 CREAM (GRAM) TOPICAL DAILY
Qty: 30 TAB | Refills: 1 | Status: SHIPPED | OUTPATIENT
Start: 2019-08-12

## 2019-08-12 RX ORDER — CEPHALEXIN 250 MG/1
250 CAPSULE ORAL 4 TIMES DAILY
Qty: 28 CAP | Refills: 0 | Status: SHIPPED | OUTPATIENT
Start: 2019-08-12 | End: 2019-10-08

## 2019-08-12 RX ADMIN — SODIUM CHLORIDE TAB 1 GM 1 G: 1 TAB at 15:25

## 2019-08-12 RX ADMIN — IPRATROPIUM BROMIDE 2 SPRAY: 21 SPRAY NASAL at 15:22

## 2019-08-12 RX ADMIN — FOLIC ACID: 5 INJECTION, SOLUTION INTRAMUSCULAR; INTRAVENOUS; SUBCUTANEOUS at 10:53

## 2019-08-12 RX ADMIN — IPRATROPIUM BROMIDE 2 SPRAY: 21 SPRAY NASAL at 09:21

## 2019-08-12 RX ADMIN — PANTOPRAZOLE SODIUM 40 MG: 40 INJECTION, POWDER, LYOPHILIZED, FOR SOLUTION INTRAVENOUS at 09:20

## 2019-08-12 RX ADMIN — SODIUM CHLORIDE TAB 1 GM 1 G: 1 TAB at 09:19

## 2019-08-12 RX ADMIN — CEPHALEXIN 250 MG: 250 CAPSULE ORAL at 15:25

## 2019-08-12 RX ADMIN — CEPHALEXIN 250 MG: 250 CAPSULE ORAL at 09:19

## 2019-08-12 RX ADMIN — Medication 10 ML: at 09:23

## 2019-08-12 NOTE — PROGRESS NOTES
Verbal bedside received from Judge Leo JONES off going nurse. Assumed patient care, bed in low position, call light within reach, white board updated. 2200 Assessment completed and meds administered. CIWA score of 1.     0600 Patient had uneventful night. No acute distress. Bedside and Verbal shift change report given to Venkata Aguilar (oncoming nurse) by Gopi Gee (offgoing nurse). Report included the following information SBAR, Kardex and MAR.

## 2019-08-12 NOTE — DISCHARGE SUMMARY
[address] [facility] SUMMARY    Name:  Melanie Patiño  MR#:   [id number]  :  1945  ACCOUNT #:  [de-identified]  ADMIT DATE:  2019  DISCHARGE DATE:  2019    DISCHARGE DIAGNOSES:  1.  Epistaxis. 2.  Hyponatremia. 3.  Alcohol use. 4.  Fall with left facial contusion. 5.  Chronic atrial fibrillation. 6.  Hypertension. HOSPITAL CONSULTANT:  Kiki Tavarez MD, ENT. HOSPITAL SUMMARY:  This is a patient who is transferred from [facility] due to need for ENT evaluation due to epistaxis, being on Eliquis. He is a gentleman at 66years old who drinks at least six to seven alcoholic beverages daily. He has a history of hypertension, chronic AFib. He is on Eliquis for anticoagulation. He sees a cardiologist near [place] and has a primary care physician there also. He came down to [facility] for admission. He was seen by ENT. He had packing done at [facility].  There was some further bleeding down here at this hospital, so ENT saw him in consultation, recommended to give the packing at least 3 days, possibly 5. So at this point, he has had a packing in on his left naris as well as being on empiric antibiotics since Thursday night, so this is the fourth day. He is anxious to get the packing removed today; it is very uncomfortable for him. So we agreed that we would take it out and monitor. There was a slight bit of bright red blood on the packing when it came out, otherwise there is no evidence for active bleeding coming from his nose. I have asked him to rest this morning, not blow his nose, not spray anything in there, and we will watch him through till the afternoon. As far as his hyponatremia, he had a fall at home. It sounds as if he has been rather debilitated recently, was unsteady on his feet, certainly maybe related to the alcohol use he has, which is quite extensive, and his wife confirms this.   He had a sodium of 123 on 08/08/2019 at 5 p.m. He was treated initially with IV fluids, seen in consultation by Nephrology. Fluids were stopped, then he became nauseous and was not able eat much, so we gave some more fluids and a banana bag yesterday. His sodium has come up beautifully to 131 and he is feeling better today. Renal function is preserved with a creatinine of 0.74, GFR greater than 60. Metabolic panel is unremarkable otherwise. He had a TSH level which is 1.70. His INR is 1.2, H and H is stable at 9.7 and 27.3; also looks like when he came in, it was 13.9 and 37.8, looks like there may be some hemodilutional effect there. His MCV is elevated at 98; I suspect could be due to the alcohol history. We placed him on a banana bag as noted above. He had a fall prior to coming to that facility [place] in [place] He injured his left face, bruised his eye, had a cut above his left eyebrow, and that all seems to be stable at this point in time. He is anxious to leave the hospital today. He has eaten this morning. He has been up and ambulatory. We had Physical Therapy see him in consultation and they recommended that he is improving slowly and could utilize home health. He had no loss of balance during ambulation. So with that today, the patient is awake and alert, in good spirits, no acute distress. We have had extensive discussion about his alcohol cessation with his wife present. She is in agreement as is he at this point that his health would deteriorate further if he continues to drink at the volume that he is. Blood pressure is 117/62, pulse 79, temperature 98.5, respiratory rate is 18, SaO2 is 100%. Bruising to the left orbit and above the left eyebrow which is stable. Packing was removed from the left naris. Small amount of bright red blood on the packing, otherwise looks stable. Oropharynx is dry. Lungs are clear bilaterally. Cardiac exam irregular rhythm, regular rate.   Abdomen is soft and nontender, and lower extremities are without edema. Also of note, when he came into the hospital, his troponin was 0.04, hemoglobin A1c 5%, CK was 105, LFTs were unremarkable. Of note, it looks like his fall was a few days prior to the nosebleed. As I reviewed the ER notes, it looks like he presented with a fall and laceration above his left eyebrow and nosebleed with a note signed on 08/05/2019. He had a minimally displaced right rib fracture on x-ray that was done on 08/05/2019. I did a CT of his head, and he had  a nondisplaced fracture of the anterior aspect of the left orbital floor. Again, he came to us on 08/09/2019. He also is on HCTZ with Diovan combination at home, which was stopped here due to the hyponatremia. 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 No acute intracranial abnormalities. The patient had no chest pain, palpitations, shortness of breath since his admission. Yesterday, he had some nausea and dry heaving that is resolved. He has eaten this morning. I think he can discharge from the hospital today. Again extensive discussion about alcohol cessation, not taking his HCTZ again. For discharge, following up closely with his primary care physician, have sodium level repeated, and to meet with his cardiologist about when he can go back on to the [facility] considering epistaxis. I would recommend at least another 24-48 hours off this medication and blood thinner due to the nosebleed he had. DISCHARGE MEDICATIONS:  For his discharge, we anticipate the following medications:  1. Multivitamin once a day. 2.  Claritin as needed for allergies. 3.  Tylenol as needed for headache or pain. 4.  MiraLax daily as needed for constipation. 5.  Keflex 500 mg four times daily for 7 days.   6.  Diovan 160 mg daily without the HCTZ. 7.  Thiamine 100 mg daily. DISCHARGE INSTRUCTIONS:  Hold Eliquis until he talks with his cardiologist.  Follow up with his primary care physician, Dr. Michael Salguero, in 24-48 hours. I have discussed all that at length with both him and his wife. His wife is going to call the cardiology office to see what their recommendations are about the Eliquis. Also with his fall and alcohol use, I think there is some risk moving forward, but of course there is risk of thromboembolism if he does not take an anticoagulant with his chronic AFib. I am going to repeat his EKG this morning just because of the abnormalities noted on the admission EKG, but he has had no cardiopulmonary symptoms here. He has chronic AFib as noted. I will get that now and we will review that anticipating that he may discharge this afternoon if there is no further bleeding and he is stable otherwise without nausea, vomiting, headache, dizziness, or other symptomatology. 40 minutes on discharge time today.       Raphael Ferrer MD      [id number]  D:  08/12/2019 10:15  T:  08/12/2019 10:20  JOB #:  [id number]  CC:  Rhonda Sotelo MD

## 2019-08-12 NOTE — PROGRESS NOTES
0730 Assumed care of patient from off-going nurse Janine Galicia at bedside, removed rhino rocket from left nare. No bleeding at this time, will continue to monitor.

## 2019-08-12 NOTE — PROGRESS NOTES
Hospitalist Progress Note    Patient: Choco Whiteside MRN: [id number]  CSN: [id number]    YOB: 1945  Age: 66 y.o. Sex: male    DOA: 8/9/2019 LOS:  LOS: 3 days          EKG shows Afib, ST changes, recurrent since EKG on admission. but he has no cardiopulmonary symptoms, and normal enzymes on admission. He sees cardiologist in [facility], and I have asked for him to have follow up this week to be scheduled. He has chronic cardiac disease and I want to make sure he is up to date on stress testing and echo. No indication to keep him inpatient for this as he is feeling better and has no chest pain, nausea, dizziness, or SOB and feels well enough to go home. His hemodynamnics are also stable.

## 2019-08-12 NOTE — PROGRESS NOTES
Problem: Mobility Impaired (Adult and Pediatric)  Goal: *Acute Goals and Plan of Care (Insert Text)  Description  Physical Therapy Goals  Initiated 8/10/2019 and to be accomplished within 7 day(s)  1. Patient will move from supine to sit and sit to supine  in bed with supervision/set-up. 2.  Patient will transfer from bed to chair and chair to bed with supervision/set-up using the least restrictive device. 3.  Patient will perform sit to stand with supervision/set-up. 4.  Patient will ambulate with supervision/set-up for 250 feet with the least restrictive device. 5.  Patient will ascend/descend 6 stairs with 1 handrail(s) with supervision/set-up. Outcome: Progressing Towards Goal   PHYSICAL THERAPY TREATMENT    Patient: Sandra Tran (1 y.o. male)  Date: 8/12/2019  Diagnosis: Hyponatremia [E87.1]  Epistaxis [R04.0] Hyponatremia    Precautions: Fall   Chart, physical therapy assessment, plan of care and goals were reviewed. ASSESSMENT:  Pt to discharge home soon. Pt and spouse/CG present and mentions pt usually uses SPC when amb. PT amb 100' with SPC CGA/Min A and HHA for safety. Pt experienced LOB while turning. Min A to recover. Standing balance with SPC is unsteady. Strongly encouraged pt/family to use RW for additional safety/support to reduce fall risk. Pt amb 100' with RW CGA for safety. Balance much improved with RW. Pt does have RW at home. Cont POC. Progression toward goals:  ?      Improving appropriately and progressing toward goals  ? Improving slowly and progressing toward goals  ? Not making progress toward goals and plan of care will be adjusted     PLAN:  Patient continues to benefit from skilled intervention to address the above impairments. Continue treatment per established plan of care.   Discharge Recommendations:  Home Health  Further Equipment Recommendations for Discharge:  rolling walker     SUBJECTIVE:   Patient stated  I am ready to get home     OBJECTIVE DATA SUMMARY:   Critical Behavior:  Neurologic State: Alert  Orientation Level: Oriented X4    Functional Mobility Training:    Transfers:  Sit to Stand: Contact guard assistance  Stand to Sit: Contact guard assistance    Balance:  Sitting: Intact  Standing: Intact; With support  Ambulation/Gait Training:  Distance (ft): 100 Feet (ft)(100'x 2)  Assistive Device: Walker, rolling;Gait belt;Cane, straight  Ambulation - Level of Assistance: Contact guard assistance  Gait Abnormalities: Decreased step clearance; Path deviations; Step to gait  Base of Support: Narrowed  Speed/Mila: Slow  Step Length: Right shortened;Left shortened    Pain:  Pain Scale 1: Numeric (0 - 10)  Pain Intensity 1: 0    Activity Tolerance:   Fair     After treatment:   ? Patient left in no apparent distress sitting up in chair  ? Patient left in no apparent distress in bed (EOB)   ? Call bell left within reach  ? Nursing notified  ? Caregiver present  ?  Bed alarm activated      Gareth Boykin PTA   Time Calculation: 23 mins

## 2019-08-12 NOTE — PROGRESS NOTES
Dual AVS reviewed with DENISA Doe . All medications reviewed individually with patient. Opportunities for questions and concerns provided. Patient discharged via (mode of transport ie. Car, ambulance or air transport) car with significant other. Patient's arm band appropriately discarded.

## 2019-08-12 NOTE — PROGRESS NOTES
1914 Assumed care from Marguerite Oquendo RN.    1120 Bedside and Verbal shift change report given to Yany Stafford RN (oncoming nurse) by Aryan Le RN   (offgoing nurse). Report included the following information SBAR, Kardex, ED Summary, Intake/Output, MAR, Recent Results and Med Rec Status.

## 2019-08-12 NOTE — PROGRESS NOTES
Nephrology Progress Note    Subjective: This is a 67 yo white male with a PMH of HTN, etoh abuse, frequent falls most recently tripped and fell on his face who presented with epistaxis. He has left orbital fracture, Na 123 initially and was hypotensive. Repeat Na 125 after some hydration, BP improved. he denies CP, SOB, N/V- appetite has been fine. He was on HCT at home. Started on salt tablets and placed hctz on hold. Cr only up to 126 today. Denies headache, dizziness, N/V today. 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 4 with vit K 10 mL, thiamine 219 mg, folic acid 1 mg infusion   IntraVENous DAILY    ondansetron (ZOFRAN) injection 4 mg  4 mg IntraVENous Q6H PRN    pantoprazole (PROTONIX) 40 mg in sodium chloride 0.9% 10 mL injection  40 mg IntraVENous Q12H    ipratropium (ATROVENT) 0.03 % nasal spray 2 Spray  2 Spray Right Nostril QID    polyethylene glycol (MIRALAX) packet 17 g  17 g Oral DAILY    senna-docusate (PERICOLACE) 8.6-50 mg per tablet 2-4 Tab  2-4 Tab Oral DAILY PRN    sodium chloride (NS) flush 5-40 mL  5-40 mL IntraVENous Q8H    sodium chloride (NS) flush 5-40 mL  5-40 mL IntraVENous PRN    LORazepam (ATIVAN) tablet 1 mg  1 mg Oral Q1H PRN    Or    LORazepam (ATIVAN) injection 1 mg  1 mg IntraVENous Q1H PRN    LORazepam (ATIVAN) tablet 2 mg  2 mg Oral Q1H PRN    Or    LORazepam (ATIVAN) injection 2 mg  2 mg IntraVENous Q1H PRN    LORazepam (ATIVAN) injection 3 mg  3 mg IntraVENous Q15MIN PRN    cephALEXin (KEFLEX) capsule 250 mg  250 mg Oral QID    sodium chloride (OCEAN) 0.65 % nasal squeeze bottle 2 Spray  2 Spray Right Nostril Q2H PRN    ibuprofen (MOTRIN) tablet 800 mg  800 mg Oral Q6H PRN       Allergy:   No Known Allergies     Objective:     Visit Vitals  /75 (BP 1 Location: Right arm, BP Patient Position: At rest)   Pulse 66   Temp 98.2 °F (36.8 °C)   Resp 18   Wt 94.8 kg (209 lb 1.6 oz)   SpO2 100%   BMI 32.75 kg/m²       Intake/Output Summary (Last 24 hours) at 8/12/2019 1146  Last data filed at 8/12/2019 1145  Gross per 24 hour   Intake 2184.59 ml   Output 250 ml   Net 1934.59 ml       Physical Exam:       General: No acute distress   HENT: Bruise around Left eyelid. Eyes: Normal conjunctiva   Neck: Supple or no JVD   Cardiovascular: Normal S1 & S2, no m/r/g   Pulmonary/Chest Wall: Clear to auscultation bilaterally   Abdominal: Soft and non-tender   Musculoskeletal: No edema LE bilaterally   Neurological: No focal deficits       Data Review:  Recent Labs     08/12/19 0224 08/11/19  2017   * 130*   K 4.6 4.1   CL 98* 97*   CO2 27 25   BUN 19* 20*   CREA 0.74 0.74   * 173*   CA 8.1* 8.0*     Recent Labs     08/12/19  0224 08/11/19  0450   WBC 8.4 9.8   HGB 9.7* 10.2*   HCT 27.3* 28.6*    238     Recent Labs     08/10/19  0230   SGOT 27   AP 60   TP 6.2*   ALB 3.4   GLOB 2.8     No results for input(s): INR, PTP, APTT in the last 72 hours. No lab exists for component: INREXT, INREXT   No results for input(s): IRON, FE, TIBC, IBCT, PSAT, FERR in the last 72 hours. Most recent labs: reviewed.       Impression:   Hyponatremia- S Na improving, 131 today. HCTZ placed on hold. TSH normal.  H.o. HTN, presented with hypotension  Etoh abuse, on IV Goody bag  Mild anemia  Epistaxis, controlled. Seen by  ENT.   Left Orbital fracture    Plan:     Diovan HCT held  Continue Salt tablets and Fluid restriction 1Liter/day  Decrease lab draw to daily  D/w pt  If discharged today, will f/u with PCP at [facility] for f/u of Hyponatremia, lab draw tomorrow    Sylwia Rahman, MD [facility]  [phone]

## 2019-08-12 NOTE — PROGRESS NOTES
Problem: Falls - Risk of  Goal: *Absence of Falls  Description  Document Jorge Lopez Fall Risk and appropriate interventions in the flowsheet.   Outcome: Progressing Towards Goal  Note:   Fall Risk Interventions:  Mobility Interventions: Bed/chair exit alarm, Utilize walker, cane, or other assistive device, Utilize gait belt for transfers/ambulation         Medication Interventions: Patient to call before getting OOB, Teach patient to arise slowly, Utilize gait belt for transfers/ambulation    Elimination Interventions: Bed/chair exit alarm, Urinal in reach    History of Falls Interventions: Bed/chair exit alarm, Room close to nurse's station, Utilize gait belt for transfer/ambulation         Problem: Patient Education: Go to Patient Education Activity  Goal: Patient/Family Education  Outcome: Progressing Towards Goal     Problem: Pain  Goal: *Control of Pain  Outcome: Progressing Towards Goal     Problem: Patient Education: Go to Patient Education Activity  Goal: Patient/Family Education  Outcome: Progressing Towards Goal

## 2019-08-12 NOTE — PROGRESS NOTES
Transition of care: d/c home today  Met with patient and wife at bedside. Patient plans to d/c today if he does he would like to use [facility] per foc. Referral placed however they do not cover the area he lives with submit to [facility]. Patient has   Crys Bruce MD    Next steps: Follow up    Via [address], [address] [place] [address] Road    Patient's wife will schedule this appointment. Follow up with Augustine Toledo MD on 8/14/2019    Specialty: Internal Medicine    [address] [address] [address]   [phone]    Follow-up appointment @ 3:00 p.m.for re-eval on nosebleed, sodium, and eliquis resumption    patient uses a cane no other need for DME. Wife will drive him home. They deny other needs from cm. Care Management Interventions  PCP Verified by CM:  Yes  Palliative Care Criteria Met (RRAT>21 & CHF Dx)?: No  Transition of Care Consult (CM Consult): [address]: Yes  Physical Therapy Consult: Yes  Current Support Network: Lives with Spouse  Confirm Follow Up Transport: Family  Plan discussed with Pt/Family/Caregiver: Yes  Freedom of Choice Offered: Yes  Discharge Location  Discharge Placement: Home with home health

## 2019-08-12 NOTE — DISCHARGE INSTRUCTIONS
DISCHARGE SUMMARY from Nurse    PATIENT INSTRUCTIONS:    What to do at Home:  Recommended activity: Activity as tolerated. Please follow up with Cardiology and Primary care physician    *  Please give a list of your current medications to your Primary Care Provider. *  Please update this list whenever your medications are discontinued, doses are      changed, or new medications (including over-the-counter products) are added. *  Please carry medication information at all times in case of emergency situations. These are general instructions for a healthy lifestyle:    No smoking/ No tobacco products/ Avoid exposure to second hand smoke  Surgeon General's Warning:  Quitting smoking now greatly reduces serious risk to your health. Obesity, smoking, and sedentary lifestyle greatly increases your risk for illness    A healthy diet, regular physical exercise & weight monitoring are important for maintaining a healthy lifestyle    You may be retaining fluid if you have a history of heart failure or if you experience any of the following symptoms:  Weight gain of 3 pounds or more overnight or 5 pounds in a week, increased swelling in our hands or feet or shortness of breath while lying flat in bed. Please call your doctor as soon as you notice any of these symptoms; do not wait until your next office visit. The discharge information has been reviewed with the patient. The patient verbalized understanding. Discharge medications reviewed with the patient and appropriate educational materials and side effects teaching were provided.     Patient armband removed and shredded  ______________________

## 2019-08-12 NOTE — HOME CARE
Thank you for referral but, Decline referral Not in area for [address] at this time.  [facility]  [facility] [phone]

## 2019-10-03 ENCOUNTER — HOSPITAL ENCOUNTER (OUTPATIENT)
Dept: GENERAL RADIOLOGY | Age: 74
Discharge: HOME | End: 2019-10-03
Payer: MEDICARE

## 2019-10-03 ENCOUNTER — HOSPITAL ENCOUNTER (OUTPATIENT)
Dept: PREADMISSION TESTING | Age: 74
Discharge: HOME | End: 2019-10-03
Payer: MEDICARE

## 2019-10-03 DIAGNOSIS — Z01.818 PREOPERATIVE EXAMINATION, UNSPECIFIED: ICD-10-CM

## 2019-10-03 LAB
ALBUMIN SERPL-MCNC: 3.5 G/DL (ref 3.4–5)
ALBUMIN/GLOB SERPL: 1.1 {RATIO} (ref 0.8–1.7)
ALP SERPL-CCNC: 71 U/L (ref 45–117)
ALT SERPL-CCNC: 25 U/L (ref 16–61)
ANION GAP SERPL CALC-SCNC: 5 MMOL/L (ref 3–18)
APPEARANCE UR: CLEAR
APTT PPP: 31.9 SEC (ref 23–36.4)
AST SERPL-CCNC: 19 U/L (ref 10–38)
BACTERIA SPEC CULT: NORMAL
BASOPHILS # BLD: 0 K/UL (ref 0–0.1)
BASOPHILS NFR BLD: 0 % (ref 0–2)
BILIRUB SERPL-MCNC: 0.5 MG/DL (ref 0.2–1)
BILIRUB UR QL: ABNORMAL
BUN SERPL-MCNC: 17 MG/DL (ref 7–18)
BUN/CREAT SERPL: 21 (ref 12–20)
CALCIUM SERPL-MCNC: 9.6 MG/DL (ref 8.5–10.1)
CHLORIDE SERPL-SCNC: 102 MMOL/L (ref 100–111)
CO2 SERPL-SCNC: 29 MMOL/L (ref 21–32)
COLOR UR: ABNORMAL
CREAT SERPL-MCNC: 0.81 MG/DL (ref 0.6–1.3)
DIFFERENTIAL METHOD BLD: ABNORMAL
EOSINOPHIL # BLD: 0.1 K/UL (ref 0–0.4)
EOSINOPHIL NFR BLD: 1 % (ref 0–5)
ERYTHROCYTE [DISTWIDTH] IN BLOOD BY AUTOMATED COUNT: 13.4 % (ref 11.6–14.5)
GLOBULIN SER CALC-MCNC: 3.1 G/DL (ref 2–4)
GLUCOSE SERPL-MCNC: 98 MG/DL (ref 74–99)
GLUCOSE UR STRIP.AUTO-MCNC: NEGATIVE MG/DL
HCT VFR BLD AUTO: 44.5 % (ref 36–48)
HGB BLD-MCNC: 15 G/DL (ref 13–16)
HGB UR QL STRIP: NEGATIVE
INR PPP: 1.1 (ref 0.8–1.2)
KETONES UR QL STRIP.AUTO: NEGATIVE MG/DL
LEUKOCYTE ESTERASE UR QL STRIP.AUTO: NEGATIVE
LYMPHOCYTES # BLD: 1.2 K/UL (ref 0.9–3.6)
LYMPHOCYTES NFR BLD: 21 % (ref 21–52)
MCH RBC QN AUTO: 33.6 PG (ref 24–34)
MCHC RBC AUTO-ENTMCNC: 33.7 G/DL (ref 31–37)
MCV RBC AUTO: 99.8 FL (ref 74–97)
MONOCYTES # BLD: 0.6 K/UL (ref 0.05–1.2)
MONOCYTES NFR BLD: 10 % (ref 3–10)
NEUTS SEG # BLD: 4.1 K/UL (ref 1.8–8)
NEUTS SEG NFR BLD: 68 % (ref 40–73)
NITRITE UR QL STRIP.AUTO: NEGATIVE
PH UR STRIP: 6 [PH] (ref 5–8)
PLATELET # BLD AUTO: 298 K/UL (ref 135–420)
PMV BLD AUTO: 10.7 FL (ref 9.2–11.8)
POTASSIUM SERPL-SCNC: 4.9 MMOL/L (ref 3.5–5.5)
PROT SERPL-MCNC: 6.6 G/DL (ref 6.4–8.2)
PROT UR STRIP-MCNC: NEGATIVE MG/DL
PROTHROMBIN TIME: 14.3 SEC (ref 11.5–15.2)
RBC # BLD AUTO: 4.46 M/UL (ref 4.7–5.5)
SERVICE CMNT-IMP: NORMAL
SODIUM SERPL-SCNC: 136 MMOL/L (ref 136–145)
SP GR UR REFRACTOMETRY: 1.03 (ref 1–1.03)
UROBILINOGEN UR QL STRIP.AUTO: 1 EU/DL (ref 0.2–1)
WBC # BLD AUTO: 6 K/UL (ref 4.6–13.2)

## 2019-10-03 PROCEDURE — 81003 URINALYSIS AUTO W/O SCOPE: CPT

## 2019-10-03 PROCEDURE — 85730 THROMBOPLASTIN TIME PARTIAL: CPT

## 2019-10-03 PROCEDURE — 36415 COLL VENOUS BLD VENIPUNCTURE: CPT

## 2019-10-03 PROCEDURE — 87086 URINE CULTURE/COLONY COUNT: CPT

## 2019-10-03 PROCEDURE — 87641 MR-STAPH DNA AMP PROBE: CPT

## 2019-10-03 PROCEDURE — 80053 COMPREHEN METABOLIC PANEL: CPT

## 2019-10-03 PROCEDURE — 85025 COMPLETE CBC W/AUTO DIFF WBC: CPT

## 2019-10-03 PROCEDURE — 85610 PROTHROMBIN TIME: CPT

## 2019-10-03 PROCEDURE — 71045 X-RAY EXAM CHEST 1 VIEW: CPT

## 2019-10-03 NOTE — PROGRESS NOTES
Pt came in for ekg but scanned in under the media tab is an ekg from cardiologist's office dated 9/27/2019 along with clearance

## 2019-10-05 LAB
BACTERIA SPEC CULT: NORMAL
SERVICE CMNT-IMP: NORMAL

## 2019-10-17 ENCOUNTER — ANESTHESIA EVENT (OUTPATIENT)
Dept: SURGERY | Age: 74
DRG: 470 | End: 2019-10-17
Payer: MEDICARE

## 2019-10-17 NOTE — PERIOP NOTES
Fax over X 2  request for Hp and Consents for all [facility] cases for rober, Was told by Balta Bailey far whatever reason their fax machines will not fax to my office or Pat office and its selective when it want to work on [facility] and [employer]. CALLED @ 1:01 and ask her  to try and fax it to the OR desk fax machine. She said yes she would try again to fax it up there.

## 2019-10-18 ENCOUNTER — HOSPITAL ENCOUNTER (INPATIENT)
Age: 74
LOS: 1 days | Discharge: HOME HEALTH | DRG: 470 | End: 2019-10-19
Attending: ORTHOPAEDIC SURGERY | Admitting: ORTHOPAEDIC SURGERY
Payer: MEDICARE

## 2019-10-18 ENCOUNTER — APPOINTMENT (OUTPATIENT)
Dept: GENERAL RADIOLOGY | Age: 74
DRG: 470 | End: 2019-10-18
Attending: ORTHOPAEDIC SURGERY
Payer: MEDICARE

## 2019-10-18 ENCOUNTER — APPOINTMENT (OUTPATIENT)
Dept: GENERAL RADIOLOGY | Age: 74
DRG: 470 | End: 2019-10-18
Attending: PHYSICIAN ASSISTANT
Payer: MEDICARE

## 2019-10-18 ENCOUNTER — ANESTHESIA (OUTPATIENT)
Dept: SURGERY | Age: 74
DRG: 470 | End: 2019-10-18
Payer: MEDICARE

## 2019-10-18 DIAGNOSIS — Z96.642 HISTORY OF TOTAL HIP ARTHROPLASTY, LEFT: Primary | ICD-10-CM

## 2019-10-18 LAB
ABO + RH BLD: NORMAL
ANION GAP SERPL CALC-SCNC: 8 MMOL/L (ref 3–18)
BLOOD GROUP ANTIBODIES SERPL: NORMAL
BUN SERPL-MCNC: 16 MG/DL (ref 7–18)
BUN/CREAT SERPL: 15 (ref 12–20)
CALCIUM SERPL-MCNC: 8.6 MG/DL (ref 8.5–10.1)
CHLORIDE SERPL-SCNC: 96 MMOL/L (ref 100–111)
CO2 SERPL-SCNC: 28 MMOL/L (ref 21–32)
CREAT SERPL-MCNC: 1.09 MG/DL (ref 0.6–1.3)
GLUCOSE SERPL-MCNC: 180 MG/DL (ref 74–99)
MAGNESIUM SERPL-MCNC: 1.8 MG/DL (ref 1.6–2.6)
POTASSIUM SERPL-SCNC: 5 MMOL/L (ref 3.5–5.5)
SODIUM SERPL-SCNC: 132 MMOL/L (ref 136–145)
SPECIMEN EXP DATE BLD: NORMAL

## 2019-10-18 PROCEDURE — 36415 COLL VENOUS BLD VENIPUNCTURE: CPT

## 2019-10-18 PROCEDURE — 74011250636 HC RX REV CODE- 250/636: Performed by: ORTHOPAEDIC SURGERY

## 2019-10-18 PROCEDURE — 77030020262 HC SOL INJ SOD CL 0.9% 100ML: Performed by: ORTHOPAEDIC SURGERY

## 2019-10-18 PROCEDURE — 74011000258 HC RX REV CODE- 258: Performed by: ORTHOPAEDIC SURGERY

## 2019-10-18 PROCEDURE — C1776 JOINT DEVICE (IMPLANTABLE): HCPCS | Performed by: ORTHOPAEDIC SURGERY

## 2019-10-18 PROCEDURE — 74011000250 HC RX REV CODE- 250: Performed by: ORTHOPAEDIC SURGERY

## 2019-10-18 PROCEDURE — 77010033678 HC OXYGEN DAILY

## 2019-10-18 PROCEDURE — 77030012406 HC DRN WND PENRS BARD -A: Performed by: ORTHOPAEDIC SURGERY

## 2019-10-18 PROCEDURE — 74011250636 HC RX REV CODE- 250/636: Performed by: PHYSICIAN ASSISTANT

## 2019-10-18 PROCEDURE — 77030037400 HC ADH TISS HI VISC EXOFIN CHMP -B: Performed by: ORTHOPAEDIC SURGERY

## 2019-10-18 PROCEDURE — 65270000029 HC RM PRIVATE

## 2019-10-18 PROCEDURE — 74011250637 HC RX REV CODE- 250/637: Performed by: HOSPITALIST

## 2019-10-18 PROCEDURE — 77030007327 HC GD ROD SYNT -C: Performed by: ORTHOPAEDIC SURGERY

## 2019-10-18 PROCEDURE — 77030027138 HC INCENT SPIROMETER -A: Performed by: ORTHOPAEDIC SURGERY

## 2019-10-18 PROCEDURE — 74011250636 HC RX REV CODE- 250/636: Performed by: NURSE ANESTHETIST, CERTIFIED REGISTERED

## 2019-10-18 PROCEDURE — 76060000035 HC ANESTHESIA 2 TO 2.5 HR: Performed by: ORTHOPAEDIC SURGERY

## 2019-10-18 PROCEDURE — 74011250637 HC RX REV CODE- 250/637: Performed by: ORTHOPAEDIC SURGERY

## 2019-10-18 PROCEDURE — 97161 PT EVAL LOW COMPLEX 20 MIN: CPT

## 2019-10-18 PROCEDURE — 77030040361 HC SLV COMPR DVT MDII -B: Performed by: ORTHOPAEDIC SURGERY

## 2019-10-18 PROCEDURE — 74011250636 HC RX REV CODE- 250/636: Performed by: ANESTHESIOLOGY

## 2019-10-18 PROCEDURE — 0SRB03A REPLACEMENT OF LEFT HIP JOINT WITH CERAMIC SYNTHETIC SUBSTITUTE, UNCEMENTED, OPEN APPROACH: ICD-10-PCS | Performed by: ORTHOPAEDIC SURGERY

## 2019-10-18 PROCEDURE — 80048 BASIC METABOLIC PNL TOTAL CA: CPT

## 2019-10-18 PROCEDURE — 77030002912 HC SUT ETHBND J&J -A: Performed by: ORTHOPAEDIC SURGERY

## 2019-10-18 PROCEDURE — 77030018836 HC SOL IRR NACL ICUM -A: Performed by: ORTHOPAEDIC SURGERY

## 2019-10-18 PROCEDURE — 74011250637 HC RX REV CODE- 250/637: Performed by: PHYSICIAN ASSISTANT

## 2019-10-18 PROCEDURE — 74011250637 HC RX REV CODE- 250/637: Performed by: ANESTHESIOLOGY

## 2019-10-18 PROCEDURE — 74011000258 HC RX REV CODE- 258: Performed by: HOSPITALIST

## 2019-10-18 PROCEDURE — 74011000258 HC RX REV CODE- 258: Performed by: NURSE ANESTHETIST, CERTIFIED REGISTERED

## 2019-10-18 PROCEDURE — 74011000250 HC RX REV CODE- 250: Performed by: HOSPITALIST

## 2019-10-18 PROCEDURE — 73502 X-RAY EXAM HIP UNI 2-3 VIEWS: CPT

## 2019-10-18 PROCEDURE — 77030002933 HC SUT MCRYL J&J -A: Performed by: ORTHOPAEDIC SURGERY

## 2019-10-18 PROCEDURE — 77030020782 HC GWN BAIR PAWS FLX 3M -B: Performed by: ORTHOPAEDIC SURGERY

## 2019-10-18 PROCEDURE — 76210000000 HC OR PH I REC 2 TO 2.5 HR: Performed by: ORTHOPAEDIC SURGERY

## 2019-10-18 PROCEDURE — 83735 ASSAY OF MAGNESIUM: CPT

## 2019-10-18 PROCEDURE — 77030038010: Performed by: ORTHOPAEDIC SURGERY

## 2019-10-18 PROCEDURE — C9290 INJ, BUPIVACAINE LIPOSOME: HCPCS | Performed by: ORTHOPAEDIC SURGERY

## 2019-10-18 PROCEDURE — 77030031139 HC SUT VCRL2 J&J -A: Performed by: ORTHOPAEDIC SURGERY

## 2019-10-18 PROCEDURE — 74011250636 HC RX REV CODE- 250/636: Performed by: HOSPITALIST

## 2019-10-18 PROCEDURE — 76010000131 HC OR TIME 2 TO 2.5 HR: Performed by: ORTHOPAEDIC SURGERY

## 2019-10-18 PROCEDURE — 74011000250 HC RX REV CODE- 250: Performed by: NURSE ANESTHETIST, CERTIFIED REGISTERED

## 2019-10-18 PROCEDURE — 77030037875 HC DRSG MEPILEX <16IN BORD MOLN -A: Performed by: ORTHOPAEDIC SURGERY

## 2019-10-18 PROCEDURE — 77030011256 HC DRSG MEPILEX <16IN NO BORD MOLN -A: Performed by: ORTHOPAEDIC SURGERY

## 2019-10-18 PROCEDURE — 86900 BLOOD TYPING SEROLOGIC ABO: CPT

## 2019-10-18 PROCEDURE — 77030022295 HC SEAL BPLR VSL DISP MEDT -F: Performed by: ORTHOPAEDIC SURGERY

## 2019-10-18 DEVICE — STEM FEM SZ 6 L107MM 12/14 TAPR HI OFFSET HIP DUOFIX CLLRD: Type: IMPLANTABLE DEVICE | Site: HIP | Status: FUNCTIONAL

## 2019-10-18 DEVICE — CUP ACET DIA58MM HIP GRIPTION PRI CEMENTLESS FIX SECT SER: Type: IMPLANTABLE DEVICE | Site: HIP | Status: FUNCTIONAL

## 2019-10-18 DEVICE — ELIMINATOR H APEX FOR 48-60MM PINN HIP SHELL: Type: IMPLANTABLE DEVICE | Site: HIP | Status: FUNCTIONAL

## 2019-10-18 DEVICE — HEAD FEM DIA36MM +8MM OFFSET 12/14 TAPR HIP CERAMIC BIOLOX: Type: IMPLANTABLE DEVICE | Site: HIP | Status: FUNCTIONAL

## 2019-10-18 DEVICE — LINER ACET OD58MM ID36MM +4MM OFFSET HIP POLYETH MTL ON: Type: IMPLANTABLE DEVICE | Site: HIP | Status: FUNCTIONAL

## 2019-10-18 DEVICE — COMPONENT TOT HIP PRIMARY CERM ALTRX: Type: IMPLANTABLE DEVICE | Site: HIP | Status: FUNCTIONAL

## 2019-10-18 DEVICE — SCREW BNE L25MM DIA6.5MM CANC HIP S STL GRIPTION FULL THRD: Type: IMPLANTABLE DEVICE | Site: HIP | Status: FUNCTIONAL

## 2019-10-18 RX ORDER — OXYCODONE HYDROCHLORIDE 5 MG/1
5 TABLET ORAL
Status: DISCONTINUED | OUTPATIENT
Start: 2019-10-18 | End: 2019-10-18 | Stop reason: HOSPADM

## 2019-10-18 RX ORDER — ASPIRIN 325 MG/1
100 TABLET, FILM COATED ORAL DAILY
Status: DISCONTINUED | OUTPATIENT
Start: 2019-10-19 | End: 2019-10-18

## 2019-10-18 RX ORDER — SODIUM CHLORIDE, SODIUM LACTATE, POTASSIUM CHLORIDE, CALCIUM CHLORIDE 600; 310; 30; 20 MG/100ML; MG/100ML; MG/100ML; MG/100ML
100 INJECTION, SOLUTION INTRAVENOUS CONTINUOUS
Status: DISCONTINUED | OUTPATIENT
Start: 2019-10-18 | End: 2019-10-19 | Stop reason: HOSPADM

## 2019-10-18 RX ORDER — SODIUM CHLORIDE, SODIUM LACTATE, POTASSIUM CHLORIDE, CALCIUM CHLORIDE 600; 310; 30; 20 MG/100ML; MG/100ML; MG/100ML; MG/100ML
125 INJECTION, SOLUTION INTRAVENOUS CONTINUOUS
Status: DISCONTINUED | OUTPATIENT
Start: 2019-10-18 | End: 2019-10-18

## 2019-10-18 RX ORDER — SODIUM CHLORIDE 0.9 % (FLUSH) 0.9 %
5-40 SYRINGE (ML) INJECTION EVERY 8 HOURS
Status: DISCONTINUED | OUTPATIENT
Start: 2019-10-18 | End: 2019-10-18 | Stop reason: HOSPADM

## 2019-10-18 RX ORDER — APIXABAN 5 MG/1
5 TABLET, FILM COATED ORAL EVERY 12 HOURS
COMMUNITY
Start: 2019-09-27

## 2019-10-18 RX ORDER — KETOROLAC TROMETHAMINE 30 MG/ML
15 INJECTION, SOLUTION INTRAMUSCULAR; INTRAVENOUS EVERY 6 HOURS
Status: DISCONTINUED | OUTPATIENT
Start: 2019-10-18 | End: 2019-10-19 | Stop reason: HOSPADM

## 2019-10-18 RX ORDER — LORATADINE 10 MG/1
10 TABLET ORAL
Status: DISCONTINUED | OUTPATIENT
Start: 2019-10-18 | End: 2019-10-19 | Stop reason: HOSPADM

## 2019-10-18 RX ORDER — DEXAMETHASONE SODIUM PHOSPHATE 4 MG/ML
INJECTION, SOLUTION INTRA-ARTICULAR; INTRALESIONAL; INTRAMUSCULAR; INTRAVENOUS; SOFT TISSUE AS NEEDED
Status: DISCONTINUED | OUTPATIENT
Start: 2019-10-18 | End: 2019-10-18 | Stop reason: HOSPADM

## 2019-10-18 RX ORDER — DIPHENHYDRAMINE HCL 25 MG
25 CAPSULE ORAL
Status: DISCONTINUED | OUTPATIENT
Start: 2019-10-18 | End: 2019-10-19 | Stop reason: HOSPADM

## 2019-10-18 RX ORDER — SODIUM CHLORIDE 0.9 % (FLUSH) 0.9 %
5-40 SYRINGE (ML) INJECTION EVERY 8 HOURS
Status: DISCONTINUED | OUTPATIENT
Start: 2019-10-18 | End: 2019-10-19 | Stop reason: HOSPADM

## 2019-10-18 RX ORDER — LORAZEPAM 1 MG/1
2 TABLET ORAL
Status: DISCONTINUED | OUTPATIENT
Start: 2019-10-18 | End: 2019-10-19 | Stop reason: HOSPADM

## 2019-10-18 RX ORDER — LORAZEPAM 2 MG/ML
3 INJECTION INTRAMUSCULAR
Status: DISCONTINUED | OUTPATIENT
Start: 2019-10-18 | End: 2019-10-19 | Stop reason: HOSPADM

## 2019-10-18 RX ORDER — BUPIVACAINE HYDROCHLORIDE 2.5 MG/ML
INJECTION, SOLUTION EPIDURAL; INFILTRATION; INTRACAUDAL AS NEEDED
Status: DISCONTINUED | OUTPATIENT
Start: 2019-10-18 | End: 2019-10-18 | Stop reason: HOSPADM

## 2019-10-18 RX ORDER — PROPOFOL 10 MG/ML
INJECTION, EMULSION INTRAVENOUS AS NEEDED
Status: DISCONTINUED | OUTPATIENT
Start: 2019-10-18 | End: 2019-10-18 | Stop reason: HOSPADM

## 2019-10-18 RX ORDER — NALOXONE HYDROCHLORIDE 0.4 MG/ML
0.1 INJECTION, SOLUTION INTRAMUSCULAR; INTRAVENOUS; SUBCUTANEOUS AS NEEDED
Status: DISCONTINUED | OUTPATIENT
Start: 2019-10-18 | End: 2019-10-18 | Stop reason: HOSPADM

## 2019-10-18 RX ORDER — PROCHLORPERAZINE EDISYLATE 5 MG/ML
5 INJECTION INTRAMUSCULAR; INTRAVENOUS
Status: DISCONTINUED | OUTPATIENT
Start: 2019-10-18 | End: 2019-10-18 | Stop reason: HOSPADM

## 2019-10-18 RX ORDER — HYDROMORPHONE HYDROCHLORIDE 1 MG/ML
1 INJECTION, SOLUTION INTRAMUSCULAR; INTRAVENOUS; SUBCUTANEOUS
Status: DISCONTINUED | OUTPATIENT
Start: 2019-10-18 | End: 2019-10-19 | Stop reason: HOSPADM

## 2019-10-18 RX ORDER — HYDROCHLOROTHIAZIDE 25 MG/1
25 TABLET ORAL DAILY
Status: DISCONTINUED | OUTPATIENT
Start: 2019-10-18 | End: 2019-10-19 | Stop reason: HOSPADM

## 2019-10-18 RX ORDER — DIPHENHYDRAMINE HYDROCHLORIDE 50 MG/ML
12.5 INJECTION, SOLUTION INTRAMUSCULAR; INTRAVENOUS
Status: DISCONTINUED | OUTPATIENT
Start: 2019-10-18 | End: 2019-10-18 | Stop reason: HOSPADM

## 2019-10-18 RX ORDER — SODIUM CHLORIDE 0.9 % (FLUSH) 0.9 %
5-40 SYRINGE (ML) INJECTION AS NEEDED
Status: DISCONTINUED | OUTPATIENT
Start: 2019-10-18 | End: 2019-10-19 | Stop reason: HOSPADM

## 2019-10-18 RX ORDER — SODIUM CHLORIDE 0.9 % (FLUSH) 0.9 %
5-40 SYRINGE (ML) INJECTION AS NEEDED
Status: DISCONTINUED | OUTPATIENT
Start: 2019-10-18 | End: 2019-10-18 | Stop reason: SDUPTHER

## 2019-10-18 RX ORDER — FENTANYL CITRATE 50 UG/ML
INJECTION, SOLUTION INTRAMUSCULAR; INTRAVENOUS AS NEEDED
Status: DISCONTINUED | OUTPATIENT
Start: 2019-10-18 | End: 2019-10-18 | Stop reason: HOSPADM

## 2019-10-18 RX ORDER — LORAZEPAM 2 MG/ML
1 INJECTION INTRAMUSCULAR
Status: DISCONTINUED | OUTPATIENT
Start: 2019-10-18 | End: 2019-10-19 | Stop reason: HOSPADM

## 2019-10-18 RX ORDER — NALOXONE HYDROCHLORIDE 0.4 MG/ML
0.4 INJECTION, SOLUTION INTRAMUSCULAR; INTRAVENOUS; SUBCUTANEOUS AS NEEDED
Status: DISCONTINUED | OUTPATIENT
Start: 2019-10-18 | End: 2019-10-19 | Stop reason: HOSPADM

## 2019-10-18 RX ORDER — CEFAZOLIN SODIUM/WATER 2 G/20 ML
2 SYRINGE (ML) INTRAVENOUS EVERY 8 HOURS
Status: COMPLETED | OUTPATIENT
Start: 2019-10-18 | End: 2019-10-19

## 2019-10-18 RX ORDER — ONDANSETRON 2 MG/ML
4 INJECTION INTRAMUSCULAR; INTRAVENOUS
Status: DISCONTINUED | OUTPATIENT
Start: 2019-10-18 | End: 2019-10-19 | Stop reason: HOSPADM

## 2019-10-18 RX ORDER — OXYCODONE HYDROCHLORIDE 5 MG/1
5 TABLET ORAL
Status: DISCONTINUED | OUTPATIENT
Start: 2019-10-18 | End: 2019-10-19 | Stop reason: HOSPADM

## 2019-10-18 RX ORDER — HYDROMORPHONE HYDROCHLORIDE 1 MG/ML
0.5 INJECTION, SOLUTION INTRAMUSCULAR; INTRAVENOUS; SUBCUTANEOUS
Status: COMPLETED | OUTPATIENT
Start: 2019-10-18 | End: 2019-10-18

## 2019-10-18 RX ORDER — LORAZEPAM 1 MG/1
1 TABLET ORAL
Status: DISCONTINUED | OUTPATIENT
Start: 2019-10-18 | End: 2019-10-19 | Stop reason: HOSPADM

## 2019-10-18 RX ORDER — ACETAMINOPHEN 500 MG
1000 TABLET ORAL ONCE
Status: COMPLETED | OUTPATIENT
Start: 2019-10-18 | End: 2019-10-18

## 2019-10-18 RX ORDER — SODIUM CHLORIDE, SODIUM LACTATE, POTASSIUM CHLORIDE, CALCIUM CHLORIDE 600; 310; 30; 20 MG/100ML; MG/100ML; MG/100ML; MG/100ML
75 INJECTION, SOLUTION INTRAVENOUS CONTINUOUS
Status: DISCONTINUED | OUTPATIENT
Start: 2019-10-18 | End: 2019-10-18 | Stop reason: HOSPADM

## 2019-10-18 RX ORDER — VALSARTAN 160 MG/1
160 TABLET ORAL DAILY
Status: DISCONTINUED | OUTPATIENT
Start: 2019-10-18 | End: 2019-10-19 | Stop reason: HOSPADM

## 2019-10-18 RX ORDER — ASPIRIN 325 MG/1
100 TABLET, FILM COATED ORAL DAILY
Status: DISCONTINUED | OUTPATIENT
Start: 2019-10-21 | End: 2019-10-19 | Stop reason: HOSPADM

## 2019-10-18 RX ORDER — FLUMAZENIL 0.1 MG/ML
0.2 INJECTION INTRAVENOUS
Status: DISCONTINUED | OUTPATIENT
Start: 2019-10-18 | End: 2019-10-18 | Stop reason: HOSPADM

## 2019-10-18 RX ORDER — LORAZEPAM 2 MG/ML
1 INJECTION INTRAMUSCULAR
Status: COMPLETED | OUTPATIENT
Start: 2019-10-18 | End: 2019-10-18

## 2019-10-18 RX ORDER — KETAMINE HCL IN 0.9 % NACL 50 MG/5 ML
SYRINGE (ML) INTRAVENOUS AS NEEDED
Status: DISCONTINUED | OUTPATIENT
Start: 2019-10-18 | End: 2019-10-18 | Stop reason: HOSPADM

## 2019-10-18 RX ORDER — CEFAZOLIN SODIUM/WATER 2 G/20 ML
2 SYRINGE (ML) INTRAVENOUS ONCE
Status: COMPLETED | OUTPATIENT
Start: 2019-10-18 | End: 2019-10-18

## 2019-10-18 RX ORDER — FENTANYL CITRATE 50 UG/ML
25 INJECTION, SOLUTION INTRAMUSCULAR; INTRAVENOUS AS NEEDED
Status: DISCONTINUED | OUTPATIENT
Start: 2019-10-18 | End: 2019-10-18 | Stop reason: HOSPADM

## 2019-10-18 RX ORDER — SODIUM CHLORIDE 0.9 % (FLUSH) 0.9 %
5-40 SYRINGE (ML) INJECTION EVERY 8 HOURS
Status: DISCONTINUED | OUTPATIENT
Start: 2019-10-18 | End: 2019-10-18 | Stop reason: SDUPTHER

## 2019-10-18 RX ORDER — LIDOCAINE HYDROCHLORIDE 20 MG/ML
INJECTION, SOLUTION EPIDURAL; INFILTRATION; INTRACAUDAL; PERINEURAL AS NEEDED
Status: DISCONTINUED | OUTPATIENT
Start: 2019-10-18 | End: 2019-10-18 | Stop reason: HOSPADM

## 2019-10-18 RX ORDER — SODIUM CHLORIDE 0.9 % (FLUSH) 0.9 %
5-40 SYRINGE (ML) INJECTION AS NEEDED
Status: DISCONTINUED | OUTPATIENT
Start: 2019-10-18 | End: 2019-10-18 | Stop reason: HOSPADM

## 2019-10-18 RX ORDER — VALSARTAN 160 MG/1
160 TABLET ORAL DAILY
Status: DISCONTINUED | OUTPATIENT
Start: 2019-10-19 | End: 2019-10-18 | Stop reason: SDUPTHER

## 2019-10-18 RX ORDER — LANOLIN ALCOHOL/MO/W.PET/CERES
1 CREAM (GRAM) TOPICAL
Status: DISCONTINUED | OUTPATIENT
Start: 2019-10-19 | End: 2019-10-19 | Stop reason: HOSPADM

## 2019-10-18 RX ORDER — EPHEDRINE SULFATE/0.9% NACL/PF 50 MG/5 ML
SYRINGE (ML) INTRAVENOUS AS NEEDED
Status: DISCONTINUED | OUTPATIENT
Start: 2019-10-18 | End: 2019-10-18 | Stop reason: HOSPADM

## 2019-10-18 RX ORDER — AMOXICILLIN 250 MG
1 CAPSULE ORAL 2 TIMES DAILY
Status: DISCONTINUED | OUTPATIENT
Start: 2019-10-18 | End: 2019-10-19 | Stop reason: HOSPADM

## 2019-10-18 RX ORDER — ACETAMINOPHEN 500 MG
1000 TABLET ORAL EVERY 8 HOURS
Status: DISCONTINUED | OUTPATIENT
Start: 2019-10-18 | End: 2019-10-19 | Stop reason: HOSPADM

## 2019-10-18 RX ORDER — MIDAZOLAM HYDROCHLORIDE 1 MG/ML
INJECTION, SOLUTION INTRAMUSCULAR; INTRAVENOUS AS NEEDED
Status: DISCONTINUED | OUTPATIENT
Start: 2019-10-18 | End: 2019-10-18 | Stop reason: HOSPADM

## 2019-10-18 RX ORDER — LORAZEPAM 0.5 MG/1
0.5 TABLET ORAL
Status: DISCONTINUED | OUTPATIENT
Start: 2019-10-18 | End: 2019-10-19 | Stop reason: HOSPADM

## 2019-10-18 RX ORDER — LORAZEPAM 2 MG/ML
2 INJECTION INTRAMUSCULAR
Status: DISCONTINUED | OUTPATIENT
Start: 2019-10-18 | End: 2019-10-19 | Stop reason: HOSPADM

## 2019-10-18 RX ORDER — OXYCODONE HYDROCHLORIDE 5 MG/1
10 TABLET ORAL
Status: DISCONTINUED | OUTPATIENT
Start: 2019-10-18 | End: 2019-10-19 | Stop reason: HOSPADM

## 2019-10-18 RX ORDER — ONDANSETRON 2 MG/ML
INJECTION INTRAMUSCULAR; INTRAVENOUS AS NEEDED
Status: DISCONTINUED | OUTPATIENT
Start: 2019-10-18 | End: 2019-10-18 | Stop reason: HOSPADM

## 2019-10-18 RX ADMIN — SODIUM CHLORIDE, SODIUM LACTATE, POTASSIUM CHLORIDE, AND CALCIUM CHLORIDE: 600; 310; 30; 20 INJECTION, SOLUTION INTRAVENOUS at 11:55

## 2019-10-18 RX ADMIN — HYDROCHLOROTHIAZIDE 25 MG: 25 TABLET ORAL at 19:13

## 2019-10-18 RX ADMIN — DEXMEDETOMIDINE HYDROCHLORIDE 4 MCG: 100 INJECTION, SOLUTION INTRAVENOUS at 11:45

## 2019-10-18 RX ADMIN — Medication 2 G: at 10:35

## 2019-10-18 RX ADMIN — DEXMEDETOMIDINE HYDROCHLORIDE 4 MCG: 100 INJECTION, SOLUTION INTRAVENOUS at 10:39

## 2019-10-18 RX ADMIN — TRANEXAMIC ACID 1 G: 100 INJECTION, SOLUTION INTRAVENOUS at 11:51

## 2019-10-18 RX ADMIN — FENTANYL CITRATE 25 MCG: 50 INJECTION INTRAMUSCULAR; INTRAVENOUS at 13:11

## 2019-10-18 RX ADMIN — DEXMEDETOMIDINE HYDROCHLORIDE 4 MCG: 100 INJECTION, SOLUTION INTRAVENOUS at 10:49

## 2019-10-18 RX ADMIN — TRANEXAMIC ACID 1 G: 100 INJECTION, SOLUTION INTRAVENOUS at 10:29

## 2019-10-18 RX ADMIN — Medication 10 MG: at 10:37

## 2019-10-18 RX ADMIN — FENTANYL CITRATE 25 MCG: 50 INJECTION INTRAMUSCULAR; INTRAVENOUS at 13:19

## 2019-10-18 RX ADMIN — Medication 10 MG: at 11:15

## 2019-10-18 RX ADMIN — LORAZEPAM 1 MG: 2 INJECTION INTRAMUSCULAR; INTRAVENOUS at 15:57

## 2019-10-18 RX ADMIN — VALSARTAN 160 MG: 160 TABLET, FILM COATED ORAL at 15:56

## 2019-10-18 RX ADMIN — ACETAMINOPHEN 1000 MG: 500 TABLET ORAL at 10:10

## 2019-10-18 RX ADMIN — Medication 10 MG: at 10:52

## 2019-10-18 RX ADMIN — FENTANYL CITRATE 25 MCG: 50 INJECTION INTRAMUSCULAR; INTRAVENOUS at 13:01

## 2019-10-18 RX ADMIN — FENTANYL CITRATE 25 MCG: 50 INJECTION INTRAMUSCULAR; INTRAVENOUS at 12:52

## 2019-10-18 RX ADMIN — LIDOCAINE HYDROCHLORIDE 60 MG: 20 INJECTION, SOLUTION EPIDURAL; INFILTRATION; INTRACAUDAL; PERINEURAL at 10:20

## 2019-10-18 RX ADMIN — LORAZEPAM 1 MG: 2 INJECTION INTRAMUSCULAR; INTRAVENOUS at 22:38

## 2019-10-18 RX ADMIN — ACETAMINOPHEN 1000 MG: 500 TABLET ORAL at 18:19

## 2019-10-18 RX ADMIN — SODIUM CHLORIDE, SODIUM LACTATE, POTASSIUM CHLORIDE, AND CALCIUM CHLORIDE 125 ML/HR: 600; 310; 30; 20 INJECTION, SOLUTION INTRAVENOUS at 09:32

## 2019-10-18 RX ADMIN — HYDROMORPHONE HYDROCHLORIDE 0.5 MG: 1 INJECTION, SOLUTION INTRAMUSCULAR; INTRAVENOUS; SUBCUTANEOUS at 13:40

## 2019-10-18 RX ADMIN — Medication 10 MG: at 10:34

## 2019-10-18 RX ADMIN — FOLIC ACID: 5 INJECTION, SOLUTION INTRAMUSCULAR; INTRAVENOUS; SUBCUTANEOUS at 20:08

## 2019-10-18 RX ADMIN — Medication 10 ML: at 18:00

## 2019-10-18 RX ADMIN — PROPOFOL 150 MG: 10 INJECTION, EMULSION INTRAVENOUS at 10:20

## 2019-10-18 RX ADMIN — OXYCODONE HYDROCHLORIDE 5 MG: 5 TABLET ORAL at 15:57

## 2019-10-18 RX ADMIN — FENTANYL CITRATE 50 MCG: 50 INJECTION, SOLUTION INTRAMUSCULAR; INTRAVENOUS at 10:54

## 2019-10-18 RX ADMIN — Medication 2 G: at 18:19

## 2019-10-18 RX ADMIN — HYDROMORPHONE HYDROCHLORIDE 0.5 MG: 1 INJECTION, SOLUTION INTRAMUSCULAR; INTRAVENOUS; SUBCUTANEOUS at 13:52

## 2019-10-18 RX ADMIN — MIDAZOLAM 2 MG: 1 INJECTION INTRAMUSCULAR; INTRAVENOUS at 10:13

## 2019-10-18 RX ADMIN — KETOROLAC TROMETHAMINE 15 MG: 30 INJECTION, SOLUTION INTRAMUSCULAR at 18:19

## 2019-10-18 RX ADMIN — SENNOSIDES, DOCUSATE SODIUM 1 TABLET: 50; 8.6 TABLET, FILM COATED ORAL at 20:08

## 2019-10-18 RX ADMIN — ONDANSETRON HYDROCHLORIDE 4 MG: 2 INJECTION INTRAMUSCULAR; INTRAVENOUS at 10:38

## 2019-10-18 RX ADMIN — DEXAMETHASONE SODIUM PHOSPHATE 4 MG: 4 INJECTION, SOLUTION INTRAMUSCULAR; INTRAVENOUS at 10:39

## 2019-10-18 NOTE — CONSULTS
Medicine Consult    Patient:  Orlinda Sacks 66 y.o. male  Asked to evaluate patient by Dr. Mellissa Skiff   Primary Care Provider:  Joi Walker MD  Date of Admission:  10/18/2019  Reason for Consult: HTN and alcohol abuse         Assessment/Plan     Hospital Problems  Date Reviewed: 8/9/2019          Codes Class Noted POA    History of total hip arthroplasty, left ICD-10-CM: J97.503  ICD-9-CM: V43.64  10/18/2019 Unknown        ETOH abuse ICD-10-CM: F10.10  ICD-9-CM: 305.00  8/8/2019 Yes        Hypertension ICD-10-CM: I10  ICD-9-CM: 401.9  1/1/2016 Unknown              PLAN:  HTN   Well controlled at home, will continue home medication, add hctz    check renal function and electrolytes    Alcohol abuse   Banana bag, ciwa  protocol continue electrolytes monitoring replace as needed  Fall/aspiration/ seizure precaution      Hip replacement   -Continue lipid lowering therapy.  -Routine postoperative management, pain control. -DVT prophylaxis per primary team  Incentive spirometry  PT/OT  Supportive care  Thank you for allowing us to participate in  Pt's care and will follow   HPI:   CC:feel fine Orlinda Sacks is a 66 y.o. male with past medical history significant for hypertension, alcohol abuse was admitted to orthopedics service for Total Left Hip Arthroplasty  . Hospital medicine is consulted for hypertension and alcohol abuse. He reported he took his medication had a home and his blood pressure was well controlled. He drinks beer, wine and liquor on a daily basis. No history of seizures or withdrawal.  After surgery his blood pressure was elevated. He was giving 1 mg of Ativan. He denies any shortness of breath chest pain.     Past Medical History:   Diagnosis Date    Alcohol use     per \"spec pop\" sheet 21-30 etoh drinks per week    Allergic rhinitis     Arrhythmia     afib    Arthritis     Cancer [facility])     prostate    Concussion     no loc but lose of memory after that event for that day    Hypertension 2016     Past Surgical History:   Procedure Laterality Date    HX HEENT      sinus opened    HX HERNIA REPAIR  age 13    right    HX ORTHOPAEDIC Right     hip replacement    HX PROSTATECTOMY  2003    HX TONSILLECTOMY      HX VASECTOMY        Social History     Tobacco Use    Smoking status: Former Smoker    Smokeless tobacco: Never Used    Tobacco comment: quit pipe in [employer]   Substance Use Topics    Alcohol use: Yes     Alcohol/week: 21.0 standard drinks     Types: 21 Glasses of wine per week    Drug use: No     History reviewed. No pertinent family history. No current facility-administered medications on file prior to encounter. Current Outpatient Medications on File Prior to Encounter   Medication Sig Dispense Refill    valsartan (DIOVAN) 160 mg tablet Take 1 Tab by mouth daily. 30 Tab 0    thiamine HCL (B-1) 100 mg tablet Take 1 Tab by mouth daily. 30 Tab 1    loratadine (CLARITIN) 10 mg tablet Take 10 mg by mouth daily as needed for Allergies.  acetaminophen (TYLENOL) 500 mg tablet Take 1,000 mg by mouth two (2) times a day. using in the place of Percocet      sildenafil citrate (VIAGRA) 50 mg tablet Take 50 mg by mouth daily as needed for Other (erectlile dysfunction).  ELIQUIS 5 mg tablet Take 5 mg by mouth every twelve (12) hours.         No Known Allergies        Review of Systems  Constitutional: No fever, chills, diaphoresis, malaise, fatigue or weight gain/loss or falls  Skin: no itching or rashes  HEENT: no ear discomfort, hearing loss, tinnitus, epistaxis or sore throat  EYES: no blurry vision, double vision or photophobia  CARDIOVASCULAR: no claudication, cp, palpitations, orthopnea, pnd or LE edema  PULMONARY: no cough, wheeze, shortness of breath or sputum production  GI: no nausea, vomiting, diarrhea, abdominal pain, melena, hematemesis or brbpr  : no dysuria, hematuria  MUSCULOSKELETAL: no back pain, joint pain or myalgias  ENDOCRINE: no heat/cold intolerance, polyuria or polydipsia  HEME: no easy bruising or bleeding  NEURO: no unilateral weakness, numbness, tingling or seizures      Physical Exam:      Visit Vitals  BP (!) 146/95   Pulse 79   Temp 97.4 °F (36.3 °C)   Resp 14   Ht 5' 7.5\" (1.715 m)   Wt 89.4 kg (197 lb 1.6 oz)   SpO2 100%   BMI 30.41 kg/m²     Body mass index is 30.41 kg/m². Physical Exam:  GEN: well nourished, laying in bed in no acute distress  HEENT bruise on his face, nose normal,oropharynx clear, MMM  NECK: supple, trachea midline, no supraclavicular or submandibular adenopathy noted  EYES: conjuctiva normal, lids with out lesions, PERRL  HEART: RRR with out m/r/g, pmi nondisplaced, pulses 2+ distally  LUNGS: equal chest wall expansion, cta bl with out wheezes/rales or rhonchi  AB: soft, +BS, nt/nd no organomegaly  NEURO: alert, awake and oriented x3, gait not assessed, cranial nerves intact, strength 5/5 bl UE and LE, sensation intact, reflexes nonpathological. Left hip  Covered with gauze, no bleeding noted   SKIN: dry, intact, warm no breakdown noted        Laboratory Studies:      Labs: Results:       Chemistry Recent Labs     10/18/19  1900   *   *   K 5.0   CL 96*   CO2 28   BUN 16   CREA 1.09   CA 8.6   AGAP 8   BUCR 15      CBC w/Diff No results for input(s): WBC, RBC, HGB, HCT, PLT, GRANS, LYMPH, EOS, HGBEXT, HCTEXT, PLTEXT in the last 72 hours. Cardiac Enzymes No results for input(s): CPK, CKND1, SAM in the last 72 hours. No lab exists for component: CKRMB, TROIP   Coagulation No results for input(s): PTP, INR, APTT, INREXT in the last 72 hours.     Lipid Panel Lab Results   Component Value Date/Time    Cholesterol, total 100 08/09/2019 04:42 AM    HDL Cholesterol 67 (H) 08/09/2019 04:42 AM    LDL, calculated 23.4 08/09/2019 04:42 AM    VLDL, calculated 9.6 08/09/2019 04:42 AM    Triglyceride 48 08/09/2019 04:42 AM    CHOL/HDL Ratio 1.5 08/09/2019 04:42 AM      BNP No results for input(s): BNPP in the last 72 hours. Liver Enzymes No results for input(s): TP, ALB, TBIL, AP, SGOT, GPT in the last 72 hours. No lab exists for component: DBIL   Thyroid Studies Lab Results   Component Value Date/Time    TSH 1.70 08/09/2019 04:42 AM            Imaging studies personally reviewed:  Significant Diagnostic Studies: Xr Chest Sngl V    Result Date: 10/4/2019  PA CHEST: INDICATION: Preoperative evaluation prior to left total hip arthroplasty Comparison with a portable chest from 8/9/2019. That study showed several rib fractures without active cardiopulmonary disease. New small right pleural effusion with associated right lower lobe atelectasis/infiltrate. Again noted is a minimally displaced fracture of the anterolateral right fifth rib. The other fractures are not well visualized on this single view. No pneumothorax. --------------------    Impression: Interval development of small right pleural effusion with associated right lower lobe atelectasis/infiltrate in this patient with a known right rib fracture(s). No pneumothorax. Xr Hip Lt W Or Wo Pelv 2-3 Vws    Result Date: 10/18/2019  EXAM: Left hip, 2 views COMPARISON: 1/25/2019. INDICATION: Left hip pain, status post left hip arthroplasty. _______________ FINDINGS: AP portable and crosstable lateral projections of the left hip obtained on 3 exposures. Surgical changes and hardware of total left hip arthroplasty. No lucency adjacent to the hardware or other findings to suggest complication. No acute fracture. Expected adjacent soft tissue swelling and emphysema. Surgical clips project over the pelvis, as previously. _______________    IMPRESSION: Status post total left hip arthroplasty, without complication. [employer] Technologist Service    Result Date: 10/18/2019  Intraoperative fluoroscopy for procedure/surgery. History: Right hip pain, replacement Technique/findings: Intraoperative fluoroscopy provided during intraoperative right hip arthroplasty.  Arthroplasty in grossly normal alignment on these projections. Expected postsurgical changes in the adjacent soft tissues. Multiple surgical clips project over the lower pelvis. Left hip arthroplasty partially visualized. Please see full operative report for discussion of findings and details of procedure.  Fluoroscopic time: 0.5 minutes Fluoroscopic dose (reference air kerma): 3.39 mGy Fluoroscopic images: 3     Impression: As above            Vernon Yadav MD, Internal Medicine

## 2019-10-18 NOTE — PERIOP NOTES
1452: Pt transferred to rm 225. Temp 97.9, HR 81, RR 16, /101, O2 sat 100% on 2 l/min. Informed Mg Harding RN of pt's ETOH use that was communicated by [facility] from pharmacy. 1501: 151/93.     1505: CIWA 2. Mg Harding RN to contact Dr. Yamilex Calixto for further orders.

## 2019-10-18 NOTE — PROGRESS NOTES
Admission Medication Reconciliation has been performed on this patient POD 0 consisting of review of admission orders and PTA Home Medication List in relation to patient's Allergies and PMH as well as formulary availability and to ensure home medications have been continued/held appropriately during patient's hospital stay and timed appropriately based on last dose taken. Pt here for HIP ARTHROPLASTY TOTAL ANTERIOR APPROACH with Deisy Gomez MD    Pt allergies:Patient has no known allergies. Is pt considered \"special population\" ?:  yes for afib and chronic ETOH (21-30 drinks/week). Spoke w/ PACU KAREN Velazquez to see if CIWA scale ordered. No mention of chronic ETOH use in progress notes so far. Added ETOH use w/ clarifying note to PMH. RN Jaclyn Velazquez will update receiving RN with report. Patient's outpatient pharmacy is [facility]    Prior to Admission Medications   Prescriptions Last Dose Informant Patient Reported? Taking?   acetaminophen (TYLENOL) 500 mg tablet 10/17/2019 at 2000  Yes Yes   Sig: Take 1,000 mg by mouth two (2) times a day. using in the place of Percocet   alendronate (FOSAMAX) 10 mg tablet 10/17/2019 at 0700  Yes Yes   Sig: Take 10 mg by mouth Daily (before breakfast). apixaban (ELIQUIS PO) 10/15/2019  Yes No   Sig: Take 5 mg by mouth two (2) times a day. loratadine (CLARITIN) 10 mg tablet 10/17/2019 at 0700  Yes Yes   Sig: Take 10 mg by mouth daily as needed for Allergies. multivitamin (ONE A DAY) tablet 10/17/2019 at 0700  Yes Yes   Sig: Take 1 Tab by mouth daily. sildenafil citrate (VIAGRA) 50 mg tablet 9/18/2019 at Unknown time  Yes Yes   Sig: Take 50 mg by mouth daily as needed for Other (erectlile dysfunction). thiamine HCL (B-1) 100 mg tablet 10/14/2019 at Unknown time  No Yes   Sig: Take 1 Tab by mouth daily. valsartan (DIOVAN) 160 mg tablet 10/17/2019 at Unknown time  No Yes   Sig: Take 1 Tab by mouth daily.       Facility-Administered Medications: None           [facility] Pharmacist  [phone]

## 2019-10-18 NOTE — ANESTHESIA PREPROCEDURE EVALUATION
Relevant Problems   No relevant active problems       Anesthetic History   No history of anesthetic complications            Review of Systems / Medical History  Patient summary reviewed, nursing notes reviewed and pertinent labs reviewed    Pulmonary  Within defined limits                 Neuro/Psych   Within defined limits        Pertinent negatives: No CVA   Cardiovascular    Hypertension: well controlled        Dysrhythmias : atrial fibrillation           GI/Hepatic/Renal             Pertinent negatives: No GERD and renal disease   Endo/Other        Obesity and arthritis  Pertinent negatives: No diabetes, hypothyroidism and hyperthyroidism   Other Findings              Physical Exam    Airway  Mallampati: II  TM Distance: 4 - 6 cm  Neck ROM: normal range of motion   Mouth opening: Normal     Cardiovascular    Rhythm: irregular  Rate: normal         Dental  No notable dental hx       Pulmonary  Breath sounds clear to auscultation               Abdominal  GI exam deferred       Other Findings            Anesthetic Plan    ASA: 2  Anesthesia type: general  Patient stopped xarelto <72 hours ago. Unable to offer spinal anesthesia.         Induction: Intravenous  Anesthetic plan and risks discussed with: Patient and Spouse

## 2019-10-18 NOTE — PROGRESS NOTES
Problem: Mobility Impaired (Adult and Pediatric)  Goal: *Acute Goals and Plan of Care (Insert Text)  Description  Physical Therapy Goals   Initiated 10/18/2019 and to be accomplished within 3-5 day(s)  1. Patient will move from supine <> sit with S in prep for out of bed activity and change of position. 2.  Patient will perform sit<> stand with S with LRAD in prep for transfers/ambulation. 3.  Patient will transfer from bed <> chair with S with LRAD for time up in chair for completion of ADL activity. 4.  Patient will ambulate >100 feet with LRAD/S for improved functional mobility/safe discharge. 5.  Patient will ascend/descend 3-5 stairs with handrails with minimal assistance/contact guard assist for home re-entry as needed. Outcome: Progressing Towards Goal   PHYSICAL THERAPY EVALUATION    Patient: Asya Stephenson (11 y.o. male)  Date: 10/18/2019  Primary Diagnosis: History of total hip arthroplasty, left [Z96.642]  Procedure(s) (LRB):  LEFT TOTAL HIP ARTHROPLASTY (ANTERIOR APPROACH) WITH C-ARM (Left) Day of Surgery   Precautions:Fall, WBAT    ASSESSMENT :  Based on the objective data described below, the patient presents with decrease independence w/ bed mobility, transfers,gait, and step negotiation. Pt seen in supine prior to session w/ supplemental O2, IV, BP, pulse ox, and B/L SCDs donned. Pt reported no pain at this time. Pt has no c/o lightheadedness, dizziness or nausea. Pt's BP assessed at 150/97. Pt reports PTA that he was mostly w/c bound at home. Pt able to ambulate a total of 35 ft w/ RW/GB and demonstrates a step to, antalgic gait, decrease jarrod, decrease stride length, and decrease step clearance. Pt requires constant VCs for proper gait sequencing w/ RW as pt has difficulty. Pt transferred back to room where pt was left sitting at the EOB after session, call bell and tray in reach, B/L SCDs donned, BP assessed at 143/99 after activity, nurse notified after session.       Patient will benefit from skilled intervention to address the above impairments. Patients rehabilitation potential is considered to be Good  Factors which may influence rehabilitation potential include:   ? None noted  ? Mental ability/status  ? Medical condition  ? Home/family situation and support systems  ? Safety awareness  ? Pain tolerance/management  ? Other:      PLAN :  Recommendations and Planned Interventions:  ?           Bed Mobility Training             ? Neuromuscular Re-Education  ? Transfer Training                   ? Orthotic/Prosthetic Training  ? Gait Training                          ? Modalities  ? Therapeutic Exercises          ? Edema Management/Control  ? Therapeutic Activities            ? Patient and Family Training/Education  ? Other (comment):    Frequency/Duration: Patient will be followed by physical therapy twice daily to address goals. Discharge Recommendations: Home Health  Further Equipment Recommendations for Discharge: N/A     SUBJECTIVE:   Patient stated I feel weird and disoriented right now.     OBJECTIVE DATA SUMMARY:     Past Medical History:   Diagnosis Date    Alcohol use     per \"spec pop\" sheet 21-30 etoh drinks per week    Allergic rhinitis     Arrhythmia     afib    Arthritis     Cancer ([facility] [address]     prostate    Concussion     no loc but lose of memory after that event for that day    Hypertension 2016     Past Surgical History:   Procedure Laterality Date    HX HEENT      sinus opened    HX HERNIA REPAIR  age 13    right    HX ORTHOPAEDIC Right     hip replacement    HX PROSTATECTOMY  2003    HX TONSILLECTOMY      HX VASECTOMY       Barriers to Learning/Limitations: yes;  physical  Compensate with: Verbal Cues and Tactile Cues  Prior Level of Function/Home Situation:   Home Situation  Home Environment: Private residence  # Steps to Enter: 6  Rails to Enter: Yes  Hand Rails : Bilateral(wide)  One/Two Story Residence: One story  Living Alone: No  Support Systems: Spouse/Significant Other/Partner  Patient Expects to be Discharged to[de-identified] Private residence  Current DME Used/Available at Home: Walker, rolling  Critical Behavior:  Neurologic State: Alert  Orientation Level: Oriented X4  Psychosocial  Purposeful Interaction: Yes  Pt Identified Daily Priority: Clinical issues (comment)  Car[facility] Process: Nurture loving kindness;Establish trust;Teaching/learning;Create healing environment  Caring Interventions: Reassure  Reassure: Therapeutic listening; Acceptance;Caring rounds  Skin Condition/Temp: Warm;Dry  Skin Integrity: Incision (comment)  Skin Integumentary  Skin Color: Appropriate for ethnicity; Ecchymosis (comment)  Skin Condition/Temp: Warm;Dry  Skin Integrity: Incision (comment)  Turgor: Non-tenting  Hair Growth: Present  Varicosities: Absent  Strength:    Strength: Generally decreased, functional  Tone & Sensation:   Tone: Normal  Sensation: Intact  Range Of Motion:  AROM: Generally decreased, functional  Functional Mobility:  Bed Mobility:  Supine to Sit: Contact guard assistance;Minimum assistance  Scooting: Contact guard assistance  Transfers:  Sit to Stand: Contact guard assistance;Minimum assistance(bed elevated for assistance)  Stand to Sit: Contact guard assistance;Minimum assistance  Balance:   Sitting: Intact  Standing: Intact; With support  Ambulation/Gait Training:  Distance (ft): 35 Feet (ft)  Assistive Device: Gait belt;Walker, rolling  Ambulation - Level of Assistance: Contact guard assistance;Minimal assistance  Gait Description (WDL): Exceptions to WDL  Gait Abnormalities: Antalgic;Decreased step clearance; Step to gait  Left Side Weight Bearing: As tolerated  Base of Support: Narrowed; Shift to right  Stance: Left decreased  Speed/Mila: Slow  Step Length: Left shortened;Right shortened  Swing Pattern: Left asymmetrical;Right asymmetrical  Therapeutic Exercises: Therex packet handed to pt upon assessment  Pain:  Pain Scale 1: Numeric (0 - 10)  Pain Intensity 1: 0  Pain Location 1: Hip  Pain Orientation 1: Left  Pain Description 1: Aching  Pain Intervention(s) 1: Ice  Activity Tolerance:   Fair  Please refer to the flowsheet for vital signs taken during this treatment. After treatment:   ?         Patient left in no apparent distress sitting up in chair  ? Patient left in no apparent distress in bed (sitting at the EOB)  ? Call bell left within reach  ? Nursing notified  ? Caregiver present (spouse)  ? Bed alarm activated    COMMUNICATION/EDUCATION:   ?         Fall prevention education was provided and the patient/caregiver indicated understanding. ? Patient/family have participated as able in goal setting and plan of care. ?         Patient/family agree to work toward stated goals and plan of care. ?         Patient understands intent and goals of therapy, but is neutral about his/her participation. ? Patient is unable to participate in goal setting and plan of care.     Thank you for this referral.  Abdoulaye Thompson, PT   Time Calculation: 20 mins   Eval Complexity: History: HIGH Complexity :3+ comorbidities / personal factors will impact the outcome/ POC Exam:HIGH Complexity : 4+ Standardized tests and measures addressing body structure, function, activity limitation and / or participation in recreation  Presentation: LOW Complexity : Stable, uncomplicated  Clinical Decision Making:Low Complexity ambulate >30ft  Overall Complexity:LOW

## 2019-10-18 NOTE — ANESTHESIA POSTPROCEDURE EVALUATION
Procedure(s):  LEFT TOTAL HIP ARTHROPLASTY (ANTERIOR APPROACH) WITH C-ARM. general    Anesthesia Post Evaluation      Multimodal analgesia: multimodal analgesia used between 6 hours prior to anesthesia start to PACU discharge  Patient location during evaluation: PACU  Patient participation: complete - patient participated  Level of consciousness: awake  Pain management: satisfactory to patient  Airway patency: patent  Anesthetic complications: no  Cardiovascular status: acceptable and blood pressure returned to baseline  Respiratory status: acceptable  Hydration status: acceptable  Post anesthesia nausea and vomiting:  none      Vitals Value Taken Time   /90 10/18/2019  2:33 PM   Temp 36.7 °C (98 °F) 10/18/2019 12:31 PM   Pulse 83 10/18/2019  2:35 PM   Resp 15 10/18/2019  2:35 PM   SpO2 100 % 10/18/2019  2:35 PM   Vitals shown include unvalidated device data.

## 2019-10-18 NOTE — OP NOTES
[address] [place] [address], [address] [place] [address], [address] REPLACEMENT      Patient: Alphonza Duverney MRN: [id number]  SSN: [id number]    YOB: 1945  Age: 66 y.o. Sex: male      Date of Surgery: 10/18/2019  Incision Time: 5223  Antibiotic Infusion Time: 3664  Preoperative Diagnosis: OSTEOARTHRITIS LEFT HIP   Postoperative Diagnosis: OSTEOARTHRITIS LEFT HIP   Location: [facility]  Surgeon: Nasreen Reddy MD  Physician Assistant: Frida Betts PA was medically necessary for holding of retractors for exposure and to complete the case. Assistant: Circ-1: Román De Souza RN  Physician Assistant: Joelle Nichols PA-C  Radiology Technician: Shyanne Dawson  Scrub Tech-1: Isadora Quiroz  Scrub Tech-2: Elsy Porter    Anesthesia: general + local    Procedure: Total Left Hip Arthroplasty  (CPT: 07275)    Findings: Degenerative joint disease of the left hip. Estimated Blood Loss: 200 mL    Specimens: None    Complications: None    Implants:   Implant Name Type Inv.  Item Serial No.  Lot No. LRB No. Used Action   CUP ACET SECTOR GRIPTION 58MM -- TI - [id number]  CUP ACET SECTOR GRIPTION 58MM -- TI  [facility] [employer] [id number] Left 1 Implanted   PLUG ACET APCL H ELIM POS STP --  - [id number]  PLUG ACET APCL H ELIM POS STP --   [facility] [employer] [id number] Left 1 Implanted   SCR ACET CANC PINN 6.5X25MM SS --  - [id number]  SCR ACET CANC PINN 6.5X25MM SS --   [facility] [employer] [id number] Left 1 Implanted   SCR ACET CANC PINN 6.5X25MM SS --  - [id number]  SCR ACET CANC PINN 6.5X25MM SS --   [facility] [employer] [id number] Left 1 Implanted   LINER ACET +4 NEUT [id number] -- ALTRX - [id number]  LINER ACET +4 NEUT [id number] -- ALTRX  [facility] [employer] [id number] Left 1 Implanted   STEM FEM TAPR SZ6 HI OFFSET -- ACTIS - [id number]  STEM FEM TAPR SZ6 HI OFFSET -- ACTIS  [facility] [id number] Left 1 Implanted   HEAD FEM CER [facility] EZ 36M+8.5 -- Tere Perez - [id number]  HEAD FEM CER [facility] EZ 36M+8.5 -- [employer] DEL[employer]  Shanice Miller [id number] Left 1 Implanted       Procedure Detail:  After the patient was brought to the operating suite, He was effectively anesthetized using general anesthesia, then transferred to the [place] table and secured in a standard fashion. His left hip was then prepped and draped in a normal sterile orthopedic fashion. He was given appropriate intravenous antibiotics preoperatively. After a proper timeout was performed, a direct anterior approach to the hip was performed using a short Caldwell-Juarez interval. Anterior capsulotomy was performed. The degenerative changes of the hip were noted. Femoral neck osteotomy was then performed to the templated area. The head and neck were removed. The pulvinar and labrum were excised. The acetabulum was then reamed up to 57 mm with good bleeding cancellous bone obtained. The cup was then irrigated. A 58 mm Gription cup was then impacted in place with excellent stable fixation obtained, placing the cup at about 45 degrees of abduction, 20 degrees of anteversion. 2 screws were placed superiorly. The neutral liner was then impacted in place. Attention was turned to the femur, which was delivered into the wound with a combination of extension, external rotation, and adduction, and using the hook on the [place] table to deliver the femur into the wound. The canal was broached up to a size 6 for the ACTIS stem system with excellent stable fixation obtained. A trial reduction was then performed with the high neck offset and 36 mm head balls with various neck lengths. With the +12, he appeared to have equalization of leg lengths but inadequate restoration of offset radiographically, so the decision was made to change to the +4 offset liner. The neutral liner was removed and a +4 neutral liner was placed.   With the 6 high offset broach and 36 +8.5 head there was resotration of leg lengths and excellent functional stability was noted. The trial broach was removed. The canal was irrigated. The final components were impacted in place with excellent stable fixation obtained once again. The final reduction was performed and once again leg lengths and offset were restored radiographically, using the C-arm radiographically intraoperatively, and excellent functional stability was noted. 30 cc of 0.25% marcaine and 20 cc of exparel diluted with 40 cc of NS were seperately injected into the soft tissues. The wound was then irrigated one more time, and then closed in layers. The capsule was closed with 1 Ethibond. The fascia of the tensor was closed with #1 vicryl in a running type stitch. Subcutaneous tissue was closed with 0 vicryl then the subcuticular layer closed with 3-0 Vicryl in a simple buried stitch, and the skin was closed with running subcuticular 3-0 Monocryl. Prineo was applied followed by a dry, sterile dressing. He tolerated this well, was transferred to the bed, and taken to recovery room, extubated, in stable condition. All sponge and needle counts were correct.     Signed By: Antonina Carbone MD     October 18, 2019

## 2019-10-18 NOTE — PERIOP NOTES
Notified Dr. Radha Steven patient last dose of Eliquis on 10/15/19. Verbalized understanding and to proceed with the admission.

## 2019-10-18 NOTE — INTERVAL H&P NOTE
H&P Update: 
Ava Eisenmenger was seen and examined. History and physical has been reviewed. The patient has been examined.  There have been no significant clinical changes since the completion of the originally dated History and Physical.

## 2019-10-18 NOTE — PERIOP NOTES
TRANSFER - OUT REPORT:    Verbal report given to Len Mota RN (name) on Rosmery Albright  being transferred to [address] [place] [address] (unit) for routine progression of care       Report consisted of patients Situation, Background, Assessment and   Recommendations(SBAR). Information from the following report(s) SBAR, Kardex, Procedure Summary, Intake/Output and MAR was reviewed with the receiving nurse. Lines:   Peripheral IV 10/18/19 Right Hand (Active)   Site Assessment Clean, dry, & intact 10/18/2019  2:00 PM   Phlebitis Assessment 0 10/18/2019  2:00 PM   Infiltration Assessment 0 10/18/2019  2:00 PM   Dressing Status Clean, dry, & intact 10/18/2019  2:00 PM   Dressing Type Tape;Transparent 10/18/2019  2:00 PM   Hub Color/Line Status Infusing 10/18/2019  2:00 PM   Alcohol Cap Used No 10/18/2019  9:31 AM        Opportunity for questions and clarification was provided.       Patient transported with:   O2 @ 2 liters  Registered Nurse

## 2019-10-19 VITALS
RESPIRATION RATE: 18 BRPM | HEART RATE: 82 BPM | DIASTOLIC BLOOD PRESSURE: 82 MMHG | BODY MASS INDEX: 29.87 KG/M2 | WEIGHT: 197.1 LBS | SYSTOLIC BLOOD PRESSURE: 133 MMHG | OXYGEN SATURATION: 98 % | HEIGHT: 68 IN | TEMPERATURE: 97.3 F

## 2019-10-19 LAB
ANION GAP SERPL CALC-SCNC: 10 MMOL/L (ref 3–18)
BUN SERPL-MCNC: 21 MG/DL (ref 7–18)
BUN/CREAT SERPL: 15 (ref 12–20)
CALCIUM SERPL-MCNC: 8.9 MG/DL (ref 8.5–10.1)
CHLORIDE SERPL-SCNC: 96 MMOL/L (ref 100–111)
CO2 SERPL-SCNC: 25 MMOL/L (ref 21–32)
CREAT SERPL-MCNC: 1.37 MG/DL (ref 0.6–1.3)
GLUCOSE SERPL-MCNC: 144 MG/DL (ref 74–99)
POTASSIUM SERPL-SCNC: 5.1 MMOL/L (ref 3.5–5.5)
SODIUM SERPL-SCNC: 131 MMOL/L (ref 136–145)

## 2019-10-19 PROCEDURE — 74011250637 HC RX REV CODE- 250/637: Performed by: PHYSICIAN ASSISTANT

## 2019-10-19 PROCEDURE — 36415 COLL VENOUS BLD VENIPUNCTURE: CPT

## 2019-10-19 PROCEDURE — 74011250637 HC RX REV CODE- 250/637: Performed by: ORTHOPAEDIC SURGERY

## 2019-10-19 PROCEDURE — 97165 OT EVAL LOW COMPLEX 30 MIN: CPT

## 2019-10-19 PROCEDURE — 74011250636 HC RX REV CODE- 250/636: Performed by: PHYSICIAN ASSISTANT

## 2019-10-19 PROCEDURE — 97116 GAIT TRAINING THERAPY: CPT

## 2019-10-19 PROCEDURE — 74011250637 HC RX REV CODE- 250/637: Performed by: HOSPITALIST

## 2019-10-19 PROCEDURE — 80048 BASIC METABOLIC PNL TOTAL CA: CPT

## 2019-10-19 RX ORDER — OXYCODONE AND ACETAMINOPHEN 5; 325 MG/1; MG/1
1-2 TABLET ORAL
Qty: 56 TAB | Refills: 0 | Status: SHIPPED | OUTPATIENT
Start: 2019-10-19 | End: 2019-11-18

## 2019-10-19 RX ADMIN — ACETAMINOPHEN 1000 MG: 500 TABLET ORAL at 01:05

## 2019-10-19 RX ADMIN — KETOROLAC TROMETHAMINE 15 MG: 30 INJECTION, SOLUTION INTRAMUSCULAR at 06:07

## 2019-10-19 RX ADMIN — APIXABAN 5 MG: 2.5 TABLET, FILM COATED ORAL at 12:51

## 2019-10-19 RX ADMIN — APIXABAN 5 MG: 2.5 TABLET, FILM COATED ORAL at 01:05

## 2019-10-19 RX ADMIN — KETOROLAC TROMETHAMINE 15 MG: 30 INJECTION, SOLUTION INTRAMUSCULAR at 12:51

## 2019-10-19 RX ADMIN — KETOROLAC TROMETHAMINE 15 MG: 30 INJECTION, SOLUTION INTRAMUSCULAR at 01:05

## 2019-10-19 RX ADMIN — ACETAMINOPHEN 1000 MG: 500 TABLET ORAL at 10:57

## 2019-10-19 RX ADMIN — VALSARTAN 160 MG: 160 TABLET, FILM COATED ORAL at 08:06

## 2019-10-19 RX ADMIN — HYDROCHLOROTHIAZIDE 25 MG: 25 TABLET ORAL at 08:06

## 2019-10-19 RX ADMIN — Medication 10 ML: at 06:07

## 2019-10-19 RX ADMIN — SENNOSIDES, DOCUSATE SODIUM 1 TABLET: 50; 8.6 TABLET, FILM COATED ORAL at 08:06

## 2019-10-19 RX ADMIN — FERROUS SULFATE TAB 325 MG (65 MG ELEMENTAL FE) 325 MG: 325 (65 FE) TAB at 08:06

## 2019-10-19 RX ADMIN — Medication 2 G: at 01:05

## 2019-10-19 NOTE — PROGRESS NOTES
Problem: Mobility Impaired (Adult and Pediatric)  Goal: *Acute Goals and Plan of Care (Insert Text)  Description  Physical Therapy Goals   Initiated 10/18/2019 and to be accomplished within 3-5 day(s)  1. Patient will move from supine <> sit with S in prep for out of bed activity and change of position. 2.  Patient will perform sit<> stand with S with LRAD in prep for transfers/ambulation. 3.  Patient will transfer from bed <> chair with S with LRAD for time up in chair for completion of ADL activity. 4.  Patient will ambulate >100 feet with LRAD/S for improved functional mobility/safe discharge. 5.  Patient will ascend/descend 3-5 stairs with handrails with minimal assistance/contact guard assist for home re-entry as needed. 10/19/2019 1320 by Wilbur Kent PTA  Outcome: Resolved/Met  10/19/2019 1010 by Wilbur Kent PTA  Outcome: Progressing Towards Goal   PHYSICAL THERAPY TREATMENT AND DISCHARGE    Patient: Carlee Lange (77 y.o. male)  Date: 10/19/2019  Diagnosis: History of total hip arthroplasty, left [Z96.642] <principal problem not specified>  Procedure(s) (LRB):  LEFT TOTAL HIP ARTHROPLASTY (ANTERIOR APPROACH) WITH C-ARM (Left) 1 Day Post-Op  Precautions: WBAT, Fall  PLOF: ambulatory with a cane, has a RW    ASSESSMENT:  No assistance needed for bed mobility. Pt able to perform sit to stand with bed in lowest position. Ambulated 120ft with RW, reciprocal gt pattern, decreased step height and length (B)LEs, steady slow pace, no LOB or path deviations. Reviewed and performed seated ans supine ROM strengthening exercises. Pt left supine in bed. PLAN:  Maximum therapeutic gains met at current level of care and patient will be discharged from physical therapy at this time. Rationale for discharge:  ?     Goals Achieved  ? Plateau Reached  ? Patient not participating in therapy  ?      Other:  Discharge Recommendations:  Home Health  Further Equipment Recommendations for Discharge:  rolling walker     SUBJECTIVE:   Patient stated I use a cane in the house.     OBJECTIVE DATA SUMMARY:   Critical Behavior:  Neurologic State: Appropriate for age, Alert  Orientation Level: Appropriate for age  Cognition: Appropriate decision making, Appropriate safety awareness  Safety/Judgement: Awareness of environment  Functional Mobility Training:  Bed Mobility:    Supine to Sit: Independent  Sit to Supine: Independent  Scooting: Supervision  Transfers:  Sit to Stand: Supervision  Stand to Sit: Supervision;Stand-by assistance  Balance:  Sitting: Intact  Standing: Intact; With support   Range Of Motion:   AROM: Within functional limits   Ambulation/Gait Training:  Distance (ft): 120 Feet (ft)  Assistive Device: Gait belt;Walker, rolling  Ambulation - Level of Assistance: Stand-by assistance  Gait Abnormalities: Decreased step clearance  Left Side Weight Bearing: As tolerated    Speed/Mila: Slow;Shuffled  Step Length: Right shortened  Swing Pattern: Right asymmetrical  Stairs:  Number of Stairs Trained: 4  Stairs - Level of Assistance: Stand-by assistance;Supervision  Rail Use: Both  Therapeutic Exercises:   LLE      EXERCISE   Sets   Reps   Active Active Assist   Passive Self ROM   Comments   Ankle Pumps    ? ? ? ?    Quad Sets/Glut Sets    ? ? ? ? Hold for 5 secs   Hamstring Sets   ? ? ? ? Short Arc Quads   ? ? ? ? Heel Slides   ? ? ? ? Straight Leg Raises   ? ? ? ? Hip Add   ? ? ? ? Hold for 5 secs, w/ pillow squeeze   Long Arc Quads   ? ? ? ? Seated Marching   ? ? ? ? Standing Marching   ? ? ? ?       ? ? ? ? Pain:  Pain level pre-treatment: 0/10   Pain level post-treatment: 0/10   Pain Intervention(s): Medication (see MAR); Rest, Ice, Repositioning   Response to intervention: Nurse notified, See doc flow    Activity Tolerance:   Good  Please refer to the flowsheet for vital signs taken during this treatment.   After treatment:   ? Patient left in no apparent distress sitting up in chair  ? Patient left in no apparent distress in bed  ? Call bell left within reach  ? Nursing notified  ? Caregiver present  ? Bed alarm activated  ? SCDs applied      COMMUNICATION/EDUCATION:   ?         Role of Physical Therapy in the acute care setting. ?         Fall prevention education was provided and the patient/caregiver indicated understanding. ? Patient/family have participated as able and agree with findings and recommendations. ?         Patient is unable to participate in plan of care at this time. ?          Other:        Supriya Carcamo PTA   Time Calculation: 19 mins

## 2019-10-19 NOTE — PROGRESS NOTES
Problem: Falls - Risk of  Goal: *Absence of Falls  Description  Document Mariya Moore Fall Risk and appropriate interventions in the flowsheet.   10/18/2019 2040 by Gray Andrade RN  Outcome: Progressing Towards Goal  Note:   Fall Risk Interventions:  Mobility Interventions: Patient to call before getting OOB, PT Consult for mobility concerns, PT Consult for assist device competence, Strengthening exercises (ROM-active/passive), Utilize walker, cane, or other assistive device    Mentation Interventions: Adequate sleep, hydration, pain control    Medication Interventions: Patient to call before getting OOB, Teach patient to arise slowly    Elimination Interventions: Call light in reach, Elevated toilet seat, Patient to call for help with toileting needs, Stay With Me (per policy), Toilet paper/wipes in reach, Toileting schedule/hourly rounds, Urinal in reach           10/18/2019 2040 by Gray Andrade RN  Outcome: Progressing Towards Goal  Note:   Fall Risk Interventions:  Mobility Interventions: Patient to call before getting OOB, PT Consult for mobility concerns, PT Consult for assist device competence, Strengthening exercises (ROM-active/passive), Utilize walker, cane, or other assistive device    Mentation Interventions: Adequate sleep, hydration, pain control    Medication Interventions: Patient to call before getting OOB, Teach patient to arise slowly    Elimination Interventions: Call light in reach, Elevated toilet seat, Patient to call for help with toileting needs, Stay With Me (per policy), Toilet paper/wipes in reach, Toileting schedule/hourly rounds, Urinal in reach

## 2019-10-19 NOTE — ROUTINE PROCESS
Bedside and Verbal shift change report given to YULIA Barger RN (oncoming nurse) by GAIL Thomas RN (offgoing nurse). Report included the following information SBAR, Kardex, Intake/Output, MAR and Recent Results.

## 2019-10-19 NOTE — PROGRESS NOTES
2007 -  Head to toe assessment performed at this time. Pt denies any chest pain or SOB. Pt denies any numbness or tingling to extremities. Pt encouraged to manage pain and to use the incentive spirometer. Pt educated on the side effects of medications taken. Pt left with call light within reach and bed in low position. Will continue to monitor. 2230 - Pt sitting at the side of the bed. Pt confused as to where he is and why. Pt assessed and medicated per MAR. Pt also called wife. 2305 - Pt BP reassessed at this time. 2316 - Hospitalist paged at this time in reference to pt's BP.    2321 - Dr. Gabriella Hernandez ordered that the fluids be stopped, IV Ativan is not to be given, and to monitor BP.    2327 - Pt in bed sleeping. Bed in the low position, call light within reach, wheels locked, and bed alarm set. Will continue monitor. Shift summary - Pt had no complaints of pain. Pt's BP remained elevated. Fluids and IV Ativan were not administered. Pt left with no signs of distress.

## 2019-10-19 NOTE — DISCHARGE SUMMARY
[address] [address]     DISCHARGE SUMMARY     PATIENT: Yulia Lang     MRN: [id number]   ADMIT DATE: 10/18/2019   BILLIN   DISCHARGE DATE:  10/19/19     ATTENDING: Elia Justice MD   DICTATING: Lorenzo Rey PA-C     ADMISSION DIAGNOSIS: History of total hip arthroplasty, left [Y49.811]    DISCHARGE DIAGNOSIS: Status post OSTEOARTHRITIS LEFT HIP     HISTORY OF PRESENT ILLNESS: The patient is a 66y.o. year-old male   with ongoing left hip pain secondary to osteoarthritis of left hip(s). The patient's pain has persisted and progressed despite conservative treatments and therapies. The patient has at this time opted for surgical intervention. PAST MEDICAL HISTORY:   Past Medical History:   Diagnosis Date    Alcohol use     per \"spec pop\" sheet 21-30 etoh drinks per week    Allergic rhinitis     Arrhythmia     afib    Arthritis     Cancer ([facility] [address]     prostate    Concussion     no loc but lose of memory after that event for that day    Hypertension        PAST SURGICAL HISTORY:   Past Surgical History:   Procedure Laterality Date    HX HEENT      sinus opened    HX HERNIA REPAIR  age 13    right    HX ORTHOPAEDIC Right     hip replacement    HX PROSTATECTOMY      HX TONSILLECTOMY      HX VASECTOMY         ALLERGIES: No Known Allergies     CURRENT MEDICATIONS:  A list of medications prior to the time of admission include:  Prior to Admission medications    Medication Sig Start Date End Date Taking? Authorizing Provider   oxyCODONE-acetaminophen (PERCOCET) 5-325 mg per tablet Take 1-2 Tabs by mouth every four (4) hours as needed for Pain for up to 30 days. Max Daily Amount: 12 Tabs. 10/19/19 11/18/19 Yes Sean Watts PA-C   docusate sodium (COLACE) 50 mg capsule Take 2 Caps by mouth three (3) times daily as needed for Constipation for up to 90 days.  10/19/19 1/17/20 Yes Sean Watts PA-C alendronate (FOSAMAX) 10 mg tablet Take 10 mg by mouth Daily (before breakfast). Yes Provider, Historical   multivitamin (ONE A DAY) tablet Take 1 Tab by mouth daily. Yes Provider, Historical   valsartan (DIOVAN) 160 mg tablet Take 1 Tab by mouth daily. 8/12/19  Yes Lorie Whitney MD   thiamine HCL (B-1) 100 mg tablet Take 1 Tab by mouth daily. 8/12/19  Yes Lorie Whitney MD   loratadine (CLARITIN) 10 mg tablet Take 10 mg by mouth daily as needed for Allergies. 1/29/19  Yes Sammi Garcia MD   acetaminophen (TYLENOL) 500 mg tablet Take 1,000 mg by mouth two (2) times a day. using in the place of Percocet 1/29/19  Yes Sammi Garcia MD   sildenafil citrate (VIAGRA) 50 mg tablet Take 50 mg by mouth daily as needed for Other (erectlile dysfunction). 1/29/19  Yes Sammi Garcia MD   ELIQUIS 5 mg tablet Take 5 mg by mouth every twelve (12) hours. 9/27/19   Provider, Historical       FAMILY HISTORY: History reviewed. No pertinent family history. SOCIAL HISTORY:   Social History     Socioeconomic History    Marital status:      Spouse name: Not on file    Number of children: Not on file    Years of education: Not on file    Highest education level: Not on file   Tobacco Use    Smoking status: Former Smoker    Smokeless tobacco: Never Used    Tobacco comment: quit pipe in college   Substance and Sexual Activity    Alcohol use: Yes     Alcohol/week: 21.0 standard drinks     Types: 21 Glasses of wine per week    Drug use: No   Other Topics Concern       REVIEW OF SYSTEMS: All review of systems are negative. PHYSICAL EXAMINATION: For a detailed physical exam, please refer to the patient's chart. HOSPITAL COURSE: The patient was taken to surgery the day of admission. he underwent left total hip replacement(s) via the anterior approach. Operative course was benign. Estimated blood loss approximately 300 mL.  The patient was taken to the PACU in stable condition and was later taken to the floor in stable condition. Post-op Day #1, patient has done very well.  he has had little to no pain. he had been cleared by physical therapy with stair training. he was placed on eliquis for DVT prophylaxis. his vitals have remained stable. he has also remained hemodynamically stable. The patient has been recommended for discharge home with Home Health. DISCHARGE INSTRUCTIONS: The patient is to be discharged home with Home Health. he is to continue on his prior medications per the medication reconciliation form, to which we will add:    1. Colace 100 mg by mouth 3 times daily as needed for constipation  2. Percocet 5/325 mg; 1-2 tablets p.o. every 4 to 6 hours as needed for pain    The patient is to continue at home with home physical therapy 3 times a week to work on gait training, range of motion, strengthening, and weightbearing exercises as tolerated on his left lower extremity(ies). The patient is to progress from a walker to a cane to complete total weightbearing as tolerable. The patient is to continue to keep his incision dry. The patient is to followup with Dr. Derrick Greene in the office in 1-2 weeks status post for x-rays and further evaluation.       Xin Garcia PA-C  10/19/38360:03 AM

## 2019-10-19 NOTE — PROGRESS NOTES
Hospitalist Progress Note    Patient: Cyrilla Felty MRN: [id number]  CSN: [id number]    YOB: 1945  Age: 66 y.o. Sex: male    DOA: 10/18/2019 LOS:  LOS: 1 day              IMPRESSION and Plan:    Cyrilla Felty is a 66 y.o. male with   Patient Active Problem List    Diagnosis Date Noted    History of total hip arthroplasty, left 10/18/2019    ETOH abuse 08/08/2019    Hyponatremia 08/08/2019    Epistaxis 08/08/2019    Status post total replacement of right hip 01/11/2019    Hypertension 01/01/2016     Active Problems:    ETOH abuse (8/8/2019)      History of total hip arthroplasty, left (10/18/2019)      Hypertension (1/1/2016)        HTN  -bp/hr stable     Alcohol abuse -cessation reviwed           Hip replacement         Okay to AL home form medical standpoint     D/w daughter at bedside and updated    Patient's condition is         Recommend to continue hospitalization. Discussed with patient. Chief Complaints: No chief complaint on file. posotp management  SUBJECTIVE:  Pt is seen and examined. Chart reviwed. Hie si s/p L hip surg. Daughter is at bedside    Feels better  No CP or SOB  No Fever, chills, Nausea, vomitting. Review of systems:    Review of Systems   Constitutional: Positive for malaise/fatigue. HENT: Negative. Eyes: Negative. Respiratory: Positive for shortness of breath. Cardiovascular: Positive for leg swelling. Negative for chest pain, palpitations and orthopnea. Gastrointestinal: Negative. Negative for abdominal pain, diarrhea and heartburn. Genitourinary: Negative for dysuria and hematuria. Skin: Negative. Neurological: Positive for weakness. Psychiatric/Behavioral: Negative for depression, substance abuse and suicidal ideas. The patient is not nervous/anxious.         PE:  Patient Vitals for the past 24 hrs:   BP Temp Pulse Resp SpO2   10/19/19 1114 133/82 97.3 °F (36.3 °C) 82 18 98 %   10/19/19 0711 (!) 121/91 98.4 °F (36.9 °C) 79 18 100 %   10/19/19 0219 (!) 160/104 97.5 °F (36.4 °C) 83 18 99 %   10/19/19 0110 (!) 159/105 97.8 °F (36.6 °C) 78 18 100 %   10/18/19 2305 (!) 150/105       10/18/19 2231 (!) 185/109 97.4 °F (36.3 °C) 76 18 100 %   10/18/19 1906 (!) 146/95 97.4 °F (36.3 °C) 79 14 100 %   10/18/19 1816 (!) 143/102 97.9 °F (36.6 °C) 72 16 100 %   10/18/19 1752 (!) 143/99 98 °F (36.7 °C) 83 16 100 %   10/18/19 1652 (!) 150/97 98 °F (36.7 °C) 70 16 100 %   10/18/19 1552 (!) 143/104 97.9 °F (36.6 °C) 76 16 100 %   10/18/19 1538 (!) 162/107       10/18/19 1510 (!) 163/106 97.9 °F (36.6 °C) 81 16 100 %   10/18/19 1501 (!) 151/93       10/18/19 1452 (!) 162/101 97.9 °F (36.6 °C) 81 16 100 %   10/18/19 1433 145/90  87 16 100 %   10/18/19 1415 147/86  80 15 100 %   10/18/19 1410 137/87  82 15 100 %   10/18/19 1409 141/80  83 15 100 %   10/18/19 1405 (!) 141/101  88 16 100 %   10/18/19 1400 (!) 154/97  81 15 100 %   10/18/19 1355 147/90  89 17 99 %   10/18/19 1353   83 19 100 %   10/18/19 1352 (!) 150/103  83 19 100 %   10/18/19 1351   80 19 100 %   10/18/19 1350 (!) 144/109  84 19 100 %       Intake/Output Summary (Last 24 hours) at 10/19/2019 1348  Last data filed at 10/19/2019 1007  Gross per 24 hour   Intake 980 ml   Output 450 ml   Net 530 ml     Patient Vitals for the past 120 hrs:   Weight   10/18/19 0906 89.4 kg (197 lb 1.6 oz)         Physical Exam   Constitutional: He appears distressed. Neck: Normal range of motion. Neck supple. No JVD present. Cardiovascular: Normal rate, regular rhythm and normal heart sounds. Pulmonary/Chest: Breath sounds normal. He is in respiratory distress. Abdominal: Soft. Bowel sounds are normal. He exhibits no distension. There is no tenderness. There is no rebound. Musculoskeletal: Normal range of motion. He exhibits tenderness. He exhibits no edema. Neurological: He is alert. Skin: Skin is warm and dry.    Psychiatric: Affect normal.   Nursing note and vitals reviewed.           Intake and Output:  Current Shift:  10/19 0701 - 10/19 1900  In: -   Out: 150 [Urine:150]  Last three shifts:  10/17 1901 - 10/19 0700  In: 2180 [P.O.:980; I.V.:1200]  Out: 500 [Urine:300]    Lab/Data Reviewed:  Recent Results (from the past 8 hour(s))   METABOLIC PANEL, BASIC    Collection Time: 10/19/19  5:50 AM   Result Value Ref Range    Sodium 131 (L) 136 - 145 mmol/L    Potassium 5.1 3.5 - 5.5 mmol/L    Chloride 96 (L) 100 - 111 mmol/L    CO2 25 21 - 32 mmol/L    Anion gap 10 3.0 - 18 mmol/L    Glucose 144 (H) 74 - 99 mg/dL    BUN 21 (H) 7.0 - 18 MG/DL    Creatinine 1.37 (H) 0.6 - 1.3 MG/DL    BUN/Creatinine ratio 15 12 - 20      GFR est AA >60 >60 ml/min/1.73m2    GFR est non-AA 51 (L) >60 ml/min/1.73m2    Calcium 8.9 8.5 - 10.1 MG/DL     Medications:  Current Facility-Administered Medications   Medication Dose Route Frequency    apixaban (ELIQUIS) tablet 5 mg  5 mg Oral Q12H    loratadine (CLARITIN) tablet 10 mg  10 mg Oral DAILY PRN    lactated Ringers infusion  100 mL/hr IntraVENous CONTINUOUS    acetaminophen (TYLENOL) tablet 1,000 mg  1,000 mg Oral Q8H    oxyCODONE IR (ROXICODONE) tablet 5 mg  5 mg Oral Q4H PRN    oxyCODONE IR (ROXICODONE) tablet 10 mg  10 mg Oral Q4H PRN    ketorolac (TORADOL) injection 15 mg  15 mg IntraVENous Q6H    HYDROmorphone (PF) (DILAUDID) injection 1 mg  1 mg IntraVENous Q3H PRN    naloxone (NARCAN) injection 0.4 mg  0.4 mg IntraVENous PRN    ferrous sulfate tablet 325 mg  1 Tab Oral DAILY WITH BREAKFAST    ondansetron (ZOFRAN) injection 4 mg  4 mg IntraVENous Q4H PRN    diphenhydrAMINE (BENADRYL) capsule 25 mg  25 mg Oral Q4H PRN    senna-docusate (PERICOLACE) 8.6-50 mg per tablet 1 Tab  1 Tab Oral BID    LORazepam (ATIVAN) tablet 0.5 mg  0.5 mg Oral Q6H PRN    valsartan (DIOVAN) tablet 160 mg  160 mg Oral DAILY    LORazepam (ATIVAN) tablet 1 mg  1 mg Oral Q1H PRN    Or    LORazepam (ATIVAN) injection 1 mg  1 mg IntraVENous Q1H PRN    LORazepam (ATIVAN) tablet 2 mg  2 mg Oral Q1H PRN    Or    LORazepam (ATIVAN) injection 2 mg  2 mg IntraVENous Q1H PRN    LORazepam (ATIVAN) injection 3 mg  3 mg IntraVENous Q15MIN PRN    hydroCHLOROthiazide (HYDRODIURIL) tablet 25 mg  25 mg Oral DAILY    sodium chloride (NS) flush 5-40 mL  5-40 mL IntraVENous Q8H    sodium chloride (NS) flush 5-40 mL  5-40 mL IntraVENous PRN    LORazepam (ATIVAN) tablet 1 mg  1 mg Oral Q1H PRN    Or    LORazepam (ATIVAN) injection 1 mg  1 mg IntraVENous Q1H PRN    LORazepam (ATIVAN) tablet 2 mg  2 mg Oral Q1H PRN    Or    LORazepam (ATIVAN) injection 2 mg  2 mg IntraVENous Q1H PRN    LORazepam (ATIVAN) injection 3 mg  3 mg IntraVENous Q15MIN PRN    dextrose 5 % - 0.45% NaCl 1,000 mL with magnesium sulfate 2 g, folic acid 1 mg, thiamine 100 mg, mvi, adult no. 4 with vit K 10 mL infusion   IntraVENous DAILY    [START ON 10/21/2019] thiamine mononitrate (B-1) tablet 100 mg  100 mg Oral DAILY       Recent Results (from the past 24 hour(s))   METABOLIC PANEL, BASIC    Collection Time: 10/18/19  7:00 PM   Result Value Ref Range    Sodium 132 (L) 136 - 145 mmol/L    Potassium 5.0 3.5 - 5.5 mmol/L    Chloride 96 (L) 100 - 111 mmol/L    CO2 28 21 - 32 mmol/L    Anion gap 8 3.0 - 18 mmol/L    Glucose 180 (H) 74 - 99 mg/dL    BUN 16 7.0 - 18 MG/DL    Creatinine 1.09 0.6 - 1.3 MG/DL    BUN/Creatinine ratio 15 12 - 20      GFR est AA >60 >60 ml/min/1.73m2    GFR est non-AA >60 >60 ml/min/1.73m2    Calcium 8.6 8.5 - 10.1 MG/DL   MAGNESIUM    Collection Time: 10/18/19  7:00 PM   Result Value Ref Range    Magnesium 1.8 1.6 - 2.6 mg/dL   METABOLIC PANEL, BASIC    Collection Time: 10/19/19  5:50 AM   Result Value Ref Range    Sodium 131 (L) 136 - 145 mmol/L    Potassium 5.1 3.5 - 5.5 mmol/L    Chloride 96 (L) 100 - 111 mmol/L    CO2 25 21 - 32 mmol/L    Anion gap 10 3.0 - 18 mmol/L    Glucose 144 (H) 74 - 99 mg/dL    BUN 21 (H) 7.0 - 18 MG/DL    Creatinine 1.37 (H) 0.6 - 1.3 MG/DL BUN/Creatinine ratio 15 12 - 20      GFR est AA >60 >60 ml/min/1.73m2    GFR est non-AA 51 (L) >60 ml/min/1.73m2    Calcium 8.9 8.5 - 10.1 MG/DL       Procedures/imaging: see electronic medical records for all procedures/Xrays and details which were not copied into this note but were reviewed prior to creation of Orrie Angelucci, MD   10/19/2019, 1:48 PM

## 2019-10-19 NOTE — PROGRESS NOTES
OCCUPATIONAL THERAPY EVALUATION    Problem: Self Care Deficits Care Plan (Adult)  Goal: *Acute Goals and Plan of Care (Insert Text)  Description  Initial Occupational Therapy Goals (10/19/2019) Within 7 day(s):    1. Patient will perform perform grooming sink level for 5 minutes for increased independence in ADLs. 2. Patient will perform LB dressing with mod I & A/E PRN for increased independence with ADLs. 3. Patient will perform all aspects of toileting with mod I for increased independence with ADLs. 4. Patient will perform LE ADLs utilizing body mechanics & adaptive strategies with 1 verbal cue for increased safety in ADLs. 5. Patient will independently apply energy conservation techniques with 1 verbal cue(s) for increased independence with ADLs. Outcome: Progressing Towards Goal     Patient: Jericho Goins (45 y.o. male)  Date: 10/19/2019  Primary Diagnosis: History of total hip arthroplasty, left [Z96.642]  Procedure(s) (LRB):  LEFT TOTAL HIP ARTHROPLASTY (ANTERIOR APPROACH) WITH C-ARM (Left) 1 Day Post-Op   Precautions:   WBAT, Fall  PLOF: Mod I in most tasks with wife assist as needed    ASSESSMENT :  Based on the objective data described below, the patient presents with general weakness of LE and decrease in independence in ADL. Pt able to perform bed mobility with CGA/supervision as necessary, able to don/doff socks at EOB using bent over method, pt able thread pants on/off feet with set up. Pt able to perform toilet transfer SPT with mod I using grab bars, has grab bars in both shower and toilet at home. Performed washing hands at sink level with no support using FWW. Pt used proper safety techniques when performing sit to stand using FWW. Pt will be discharged to [facility]    Education: Educated on safety techniques for sit to stand and DME available to help assist with LB dressing if needed    Patient will benefit from skilled intervention to address the above impairments.   Patient's rehabilitation potential is considered to be Good  Factors which may influence rehabilitation potential include:   ? None noted  ? Mental ability/status  ? Medical condition  ? Home/family situation and support systems  ? Safety awareness  ? Pain tolerance/management  ? Other:      PLAN : Pt will be discharged to [facility]  Recommendations and Planned Interventions:   ?               Self Care Training                  ? Therapeutic Activities  ? Functional Mobility Training   ? Cognitive Retraining  ? Therapeutic Exercises           ? Endurance Activities  ? Balance Training                    ? Neuromuscular Re-Education  ? Visual/Perceptual Training     ? Home Safety Training  ? Patient Education                   ? Family Training/Education  ? Other (comment):    Frequency/Duration: Patient will be followed by occupational therapy daily to address goals. Discharge Recommendations: Home Health  Further Equipment Recommendations for Discharge:  sock aid     SUBJECTIVE:   Patient stated no pain today.     OBJECTIVE DATA SUMMARY:     Past Medical History:   Diagnosis Date    Alcohol use     per \"spec pop\" sheet 21-30 etoh drinks per week    Allergic rhinitis     Arrhythmia     afib    Arthritis     Cancer ([facility] [address]     prostate    Concussion     no loc but lose of memory after that event for that day    Hypertension 2016     Past Surgical History:   Procedure Laterality Date    HX HEENT      sinus opened    HX HERNIA REPAIR  age 13    right    HX ORTHOPAEDIC Right     hip replacement    HX PROSTATECTOMY  2003    HX TONSILLECTOMY      HX VASECTOMY       Barriers to Learning/Limitations: None  Compensate with: visual, verbal, tactile, kinesthetic cues/model    Home Situation:   Home Situation  Home Environment: Private residence  # Steps to Enter: 6  Rails to Enter: Yes  Hand Rails : Bilateral(wide)  One/Two Story Residence: One story  Living Alone: No  Support Systems: Spouse/Significant Other/Partner  Patient Expects to be Discharged to[de-identified] Private residence  Current DME Used/Available at Home: Raised toilet seat, Shower chair, Transfer bench, Walker, rolling  Tub or Shower Type: Tub/Shower combination  ? Right hand dominant   ? Left hand dominant    Cognitive/Behavioral Status:  Neurologic State: Appropriate for age; Alert  Orientation Level: Appropriate for age  Cognition: Appropriate decision making; Appropriate safety awareness  Safety/Judgement: Awareness of environment    Skin: intact  Edema: some noted through LLE    Vision/Perceptual:    Tracking: Able to track stimulus in all quadrants w/o difficulty    Saccades: Within Defined Limits    Convergence: Normal (within 6 inches from nose)       Acuity: Within Defined Limits       Coordination: BUE  Coordination: Within functional limits  Fine Motor Skills-Upper: Left Intact; Right Intact    Gross Motor Skills-Upper: Left Intact; Right Intact    Balance:  Sitting: Intact  Standing: Intact; With support    Strength: BUE  Strength:  Within functional limits    Tone & Sensation: BUE  Tone: Normal  Sensation: Impaired      Range of Motion: BUE  AROM: Within functional limits    Functional Mobility and Transfers for ADLs:  Bed Mobility:     Supine to Sit: Supervision  Sit to Supine: Supervision  Scooting: Supervision  Transfers:  Sit to Stand: Supervision      Toilet Transfer : Stand-by assistance           Assistive Device: Gait belt;Walker, rolling   Bathroom Mobility: Modified independent    ADL Assessment:   Feeding: Setup    Oral Facial Hygiene/Grooming: Stand-by assistance    Bathing: Stand-by assistance    Upper Body Dressing: Modified independent    Lower Body Dressing: Stand-by assistance    Toileting: Stand by assistance     ADL Intervention:       Grooming  Washing Hands: Modified independent      Lower Body Dressing Assistance  Dressing Assistance: Supervision  Socks: Supervision  Leg Crossed Method Used: No  Position Performed: Seated edge of bed  Cues: Verbal cues provided         Cognitive Retraining  Orientation Retraining: Awareness of environment  Problem Solving: Awareness of environment  Executive Functions: Executing cognitive plans  Organizing/Sequencing: Breaking task down  Attention to Task: Multi-task  Maintains Attention For (Time): 10 minutes  Following Commands: Awareness of environment  Safety/Judgement: Awareness of environment  Cues: Verbal cues provided      Pain:  Pain level pre-treatment: 0/10   Pain level post-treatment: 0/10   Pain Intervention(s): Medication provided by Nursing (see MAR); Rest, , Repositioning   Response to intervention: Nurse notified, See doc flow sheet    Activity Tolerance:   Fair. Patient able to stand 5 minute(s). Patient able to complete ADLs withrest breaks. Patient limited by pain. Please refer to the flowsheet for vital signs taken during this treatment. After treatment:   ? Patient left in no apparent distress sitting up in chair  ? Patient left in no apparent distress in bed  ? Call bell left within reach  ? Nursing notified  ? Caregiver present  ? Bed alarm activated    COMMUNICATION/EDUCATION:   ? Role of Occupational Therapy in the acute care setting  ? Home safety education was provided and the patient/caregiver indicated understanding. ? Patient/family have participated as able in goal setting and plan of care. ? Patient/family agree to work toward stated goals and plan of care. ? Patient understands intent and goals of therapy, but is neutral about his/her participation. ? Patient is unable to participate in goal setting and plan of care.     Thank you for this referral.  Marylene Infante OTD, OTR/L  Time Calculation: 12 mins    Eval Complexity: History: MEDIUM Complexity : Expanded review of history including physical, cognitive and psychosocial history ; Examination: LOW Complexity : 1-3 performance deficits relating to physical, cognitive , or psychosocial skils that result in activity limitations and / or participation restrictions ; Decision Making:MEDIUM Complexity : Patient may present with comorbidities that affect occupational performnce.  Miniml to moderate modification of tasks or assistance (eg, physical or verbal ) with assesment(s) is necessary to enable patient to complete evaluation

## 2019-10-19 NOTE — PROGRESS NOTES
Reason for Admission:   Chart reviewed as CM on call; patient is a 66 yr old male with past surgical hx of cholecystectomy 2015 and medical hx of atrial fibrillation, HTN, prostate cancer; ambulates with cane and has a wheelchair. RRAT Score: Moderate; 14             Do you (patient/family) have any concerns for transition/discharge?                    Plan for utilizing home health:       Current Advanced Directive/Advance Care Plan:              Transition of Care Plan:

## 2019-10-19 NOTE — PROGRESS NOTES
Progress Note        Patient: Elliott Caballero MRN: [id number]  SSN: [id number]    YOB: 1945  Age: 66 y.o. Sex: male      1 Day Post-Op status post Procedure(s) (LRB):  LEFT TOTAL HIP ARTHROPLASTY (ANTERIOR APPROACH) WITH C-ARM (Left)    Admit Date: 10/18/2019  Admit Diagnosis: History of total hip arthroplasty, left [R68.581]    Subjective:       Doing well. No complaints. No SOB. No Chest Pain. No Nausea or Vomiting. No problems eating or voiding. Objective:        Temp (24hrs), Av.8 °F (36.6 °C), Min:97.4 °F (36.3 °C), Max:98.4 °F (36.9 °C)    Body mass index is 30.41 kg/m². Patient Vitals for the past 12 hrs:   BP Temp Pulse Resp SpO2   10/19/19 0711 (!) 121/91 98.4 °F (36.9 °C) 79 18 100 %   10/19/19 0219 (!) 160/104 97.5 °F (36.4 °C) 83 18 99 %   10/19/19 0110 (!) 159/105 97.8 °F (36.6 °C) 78 18 100 %   10/18/19 2305 (!) 150/105       10/18/19 2231 (!) 185/109 97.4 °F (36.3 °C) 76 18 100 %     Recent Labs     10/19/19  0550   *   K 5.1   CL 96*   CO2 25   BUN 21*   CREA 1.37*   *       Physical Exam:  Vital Signs are Stable. No Acute Distress. Alert and Oriented. Negative Homans sign. Toes AROM Full. Neurovascular exam is normal.    Dressing is Clean, Dry, and Intact. Assessment/Plan:     1. Patient doing well. BP is back down this morning, pt not reporting any hypertensive sxs  2. Out of BED / PT / WBAT. Anterior Hip Precautions  3. DVT ppx: Mobilization, Eliquis, WILMER hose, and mechanical compression  4.  D/c planning     Continue PT/OT  Discharge Plan: Home with [facility] later today pending medical clearance and Pending PT clearance and oral analgesia      Signed By: Gustavo Arce PA-C     2019

## 2019-10-19 NOTE — PROGRESS NOTES
Problem: Mobility Impaired (Adult and Pediatric)  Goal: *Acute Goals and Plan of Care (Insert Text)  Description  Physical Therapy Goals   Initiated 10/18/2019 and to be accomplished within 3-5 day(s)  1. Patient will move from supine <> sit with S in prep for out of bed activity and change of position. 2.  Patient will perform sit<> stand with S with LRAD in prep for transfers/ambulation. 3.  Patient will transfer from bed <> chair with S with LRAD for time up in chair for completion of ADL activity. 4.  Patient will ambulate >100 feet with LRAD/S for improved functional mobility/safe discharge. 5.  Patient will ascend/descend 3-5 stairs with handrails with minimal assistance/contact guard assist for home re-entry as needed. Outcome: Progressing Towards Goal   PHYSICAL THERAPY TREATMENT    Patient: Rosmery Albright (65 y.o. male)  Date: 10/19/2019  Diagnosis: History of total hip arthroplasty, left [Z96.642] <principal problem not specified>  Procedure(s) (LRB):  LEFT TOTAL HIP ARTHROPLASTY (ANTERIOR APPROACH) WITH C-ARM (Left) 1 Day Post-Op  Precautions: Fall, WBAT  PLOF: ambulatory with a cane, has a RW    ASSESSMENT:  Pt coming out of bathroom with spouse upon entering room. Stressed the importance of calling for staff to assist pt for safety. Pt sat EOB s/p bathroom. Elevated bed to height of bed at home, VC for UE placement, no difficulty performing sit to stand. Ambulated with RW, increased VC and TC to ambulate within JAM of RW, decreased step height and length, decreased foot clearance on the RLE requiring VC to lift foot off the floor during swing phase of gait. Decreased pace, no LOB or path deviations. Returned to supine in bed without assistance. SCDs donned. Progression toward goals:   ?      Improving appropriately and progressing toward goals  ? Improving slowly and progressing toward goals  ?       Not making progress toward goals and plan of care will be adjusted PLAN:  Patient continues to benefit from skilled intervention to address the above impairments. Continue treatment per established plan of care. Discharge Recommendations:  Home Health  Further Equipment Recommendations for Discharge:  rolling walker     SUBJECTIVE:   Patient stated .    OBJECTIVE DATA SUMMARY:   Critical Behavior:  Neurologic State: Alert, Appropriate for age  Orientation Level: Oriented X4    Functional Mobility Training:  Bed Mobility:  Sit to Supine: Supervision  Transfers:  Sit to Stand: Stand-by assistance  Stand to Sit: Stand-by assistance  Balance:  Sitting: Intact  Standing: Intact; With support   Ambulation/Gait Training:  Distance (ft): 100 Feet (ft)  Assistive Device: Gait belt;Walker, rolling  Ambulation - Level of Assistance: Contact guard assistance  Gait Abnormalities: Antalgic;Decreased step clearance; Step to gait    Left Side Weight Bearing: As tolerated  Speed/Mila: Slow;Shuffled  Step Length: Right shortened  Swing Pattern: Right asymmetrical    Pain:  Pain level pre-treatment: 0/10  Pain level post-treatment: 0/10   Pain Intervention(s): Medication (see MAR); Rest, Ice, Repositioning   Response to intervention: Nurse notified, See doc flow    Activity Tolerance:   Fair+  Please refer to the flowsheet for vital signs taken during this treatment. After treatment:   ? Patient left in no apparent distress sitting up in chair  ? Patient left in no apparent distress in bed  ? Call bell left within reach  ? Nursing notified  ? Caregiver present  ? Bed alarm activated  ? SCDs applied      COMMUNICATION/EDUCATION:   ?         Role of Physical Therapy in the acute care setting. ?         Fall prevention education was provided and the patient/caregiver indicated understanding. ? Patient/family have participated as able in working toward goals and plan of care. ?         Patient/family agree to work toward stated goals and plan of care.   ?         Patient understands intent and goals of therapy, but is neutral about his/her participation. ? Patient is unable to participate in stated goals/plan of care: ongoing with therapy staff.   ?         Other:        Vannessa Jackman PTA   Time Calculation: 24 mins

## 2019-10-19 NOTE — PROGRESS NOTES
Problem: Falls - Risk of  Goal: *Absence of Falls  Description  Document Samantha Bourgeois Fall Risk and appropriate interventions in the flowsheet.   Outcome: Progressing Towards Goal  Note:   Fall Risk Interventions:  Mobility Interventions: Utilize walker, cane, or other assistive device, Patient to call before getting OOB, Bed/chair exit alarm    Mentation Interventions: Adequate sleep, hydration, pain control, Increase mobility, Update white board    Medication Interventions: Teach patient to arise slowly, Patient to call before getting OOB    Elimination Interventions: Call light in reach, Stay With Me (per policy), Patient to call for help with toileting needs

## 2019-10-19 NOTE — PROGRESS NOTES
3437  - Patient in bed at this time. A/O x 4. Lungs clear, radial pulses present , pedal pulses present , abdomen soft and non-distended. Bowel sounds active, 18 G IV to right hand  Intact, patent and infusing. No signs of phlebitis or infiltration noted. TEDS bilaterally and SCD to LLE and plexi to RLE. Skin warm and dry  with mepilex dressing to left hip CDI. Patient denies numbness/tingling. Pain 0/10. Bed placed in lowest position, call bell within reach. 1345- This writer has reviewed discharge instructions with patient at this time. Patient has verbalized understanding. Patient was provided with care notes to include side effects of RX's. IV removed, patient tolerated well. Arm bands removed and shredded.  AVS were reviewed with [facility].

## 2019-10-22 NOTE — DISCHARGE INSTRUCTIONS
[address] [facility]    Patient Discharge Instructions    Phan Brush / [id number] : 1945    Admitted 10/18/2019 Discharged: 10/19/2019     IF YOU HAVE ANY PROBLEMS ONCE YOU ARE AT HOME CALL THE FOLLOWING NUMBERS:   Main office number: [phone]    Your follow up appointment to see Dr. Elie Barnes is scheduled in 1-2 weeks. If you are unsure of your appointment date call the office at [phone]. Take Home Medications     · Resume your home medictions as directed  · A prescription for pain medication has been given   · It is important that you take the medication exactly as they are prescribed. · Keep your medication in the bottles provided by the pharmacist and keep a list of the medication names, dosages, and times to be taken in your wallet. · Do not take other medications without consulting your doctor. · Note:  If you have already received and/or filled a prescription for one or more of the medications you've received a prescription for when leaving the hospital, you may disregard the duplicate prescription. What to do at [address] [place] [address] Ave your prehospital diet. If you have excessive nausea or vomitting call your doctor's office    Wear portable sequential compressive devices at least 18 hours per day for 2 weeks. They may be used beyond 2 weeks as needed to help control swelling. WILMER hose should be worn during the day - may be removed for sleep. Should be worn on both legs for 2 weeks and then on the operative extremity for a total of 6 weeks. Begin In-Home Physical Therapy; 3 times a week to work on gait training, range of motion, strengthening, and weight bearing exercises as tolerable. Continue to use your walker or cane when walking; may progress from the walker to a cane to complete total bearing as tolerable. Do not bend your hip past 90 degrees. Do not cross your legs. ***  Sleep on back with pillow between legs for 6 weeks. ***    Keep incision DRY. You may need to sponge bathe to avoid getting the incision wet. When to Call    - Call if you have a temperature greater then 101  - Unable to keep food down  - Are unable to bear any wieght   - Need a pain medication refill     Information obtained by :  I understand that if any problems occur once I am at home I am to contact my physician. I understand and acknowledge receipt of the instructions indicated above.                                                                                                                                            Physician's or R.N.'s Signature                                                                  Date/Time                                                                                                                                              Patient or Representative Signature                                                          Date/Time

## 2020-06-15 NOTE — PROGRESS NOTES
Problem: Alcohol Withdrawal  Goal: *STG: Remains safe in hospital  10/18/2019 2041 by Delfino Pink RN  Outcome: Progressing Towards Goal  10/18/2019 2040 by Delfino Pink RN  Outcome: Progressing Towards Goal     Problem: Hip Replacement: Day of Surgery/Unit  Goal: *Hemodynamically stable  10/18/2019 2041 by Delfino Pink RN  Outcome: Progressing Towards Goal  10/18/2019 2040 by Delfino Pink RN  Outcome: Progressing Towards Goal     Problem: Hip Replacement: Day of Surgery/Unit  Goal: Respiratory  10/18/2019 2041 by Delfino Pink RN  Outcome: Progressing Towards Goal  10/18/2019 2040 by Delfino Pink RN  Outcome: Progressing Towards Goal     Problem: Hip Replacement: Day of Surgery/Unit  Goal: Nutrition/Diet  10/18/2019 2041 by Delfino Pink RN  Outcome: Progressing Towards Goal  10/18/2019 2040 by Delfino Pink RN  Outcome: Progressing Towards Goal     Problem: Hip Replacement: Day of Surgery/Unit  Goal: Consults, if ordered  10/18/2019 2041 by Delfino Pink RN  Outcome: Progressing Towards Goal  10/18/2019 2040 by Delfino Pink RN  Outcome: Progressing Towards Goal This document is complete and the patient is ready for discharge.

## 2020-10-02 ENCOUNTER — TRANSCRIBE ORDER (OUTPATIENT)
Dept: SCHEDULING | Age: 75
End: 2020-10-02

## 2020-10-02 DIAGNOSIS — R42 DIZZINESS: ICD-10-CM

## 2020-10-02 DIAGNOSIS — Z91.81 PERSONAL HISTORY OF FALL: Primary | ICD-10-CM

## 2020-11-24 ENCOUNTER — HOSPITAL ENCOUNTER (OUTPATIENT)
Dept: LAB | Age: 75
Discharge: HOME | End: 2020-11-24
Payer: MEDICARE

## 2020-11-24 LAB
TSH SERPL DL<=0.05 MIU/L-ACNC: 2.01 UIU/ML (ref 0.36–3.74)
VIT B12 SERPL-MCNC: 476 PG/ML (ref 211–911)

## 2020-11-24 PROCEDURE — 36415 COLL VENOUS BLD VENIPUNCTURE: CPT

## 2020-11-24 PROCEDURE — 86225 DNA ANTIBODY NATIVE: CPT

## 2020-11-24 PROCEDURE — 82525 ASSAY OF COPPER: CPT

## 2020-11-24 PROCEDURE — 82784 ASSAY IGA/IGD/IGG/IGM EACH: CPT

## 2020-11-24 PROCEDURE — 84443 ASSAY THYROID STIM HORMONE: CPT

## 2020-11-24 PROCEDURE — 82607 VITAMIN B-12: CPT

## 2020-11-24 PROCEDURE — 84425 ASSAY OF VITAMIN B-1: CPT

## 2020-11-25 LAB
CENTROMERE B AB SER-ACNC: <0.2 AI (ref 0–0.9)
CHROMATIN AB SERPL-ACNC: <0.2 AI (ref 0–0.9)
DSDNA AB SER-ACNC: 6 IU/ML (ref 0–9)
ENA JO1 AB SER-ACNC: <0.2 AI (ref 0–0.9)
ENA RNP AB SER-ACNC: <0.2 AI (ref 0–0.9)
ENA SCL70 AB SER-ACNC: <0.2 AI (ref 0–0.9)
ENA SM AB SER-ACNC: <0.2 AI (ref 0–0.9)
ENA SS-A AB SER-ACNC: 0.2 AI (ref 0–0.9)
ENA SS-B AB SER-ACNC: <0.2 AI (ref 0–0.9)
SEE BELOW, 164869: NORMAL

## 2020-11-26 LAB
IGA SERPL-MCNC: 214 MG/DL (ref 61–437)
IGG SERPL-MCNC: 1049 MG/DL (ref 603–1613)
IGM SERPL-MCNC: 101 MG/DL (ref 15–143)
PROT PATTERN SERPL IFE-IMP: NORMAL

## 2020-11-28 LAB — VIT B1 BLD-SCNC: 218.9 NMOL/L (ref 66.5–200)

## 2020-11-29 LAB — COPPER SERPL-MCNC: 117 UG/DL (ref 72–166)

## 2020-11-30 LAB
FAX TO INFO,FAXT: NORMAL
FAX TO NUMBER,FAXN: NORMAL

## 2021-01-26 ENCOUNTER — TRANSCRIBE ORDER (OUTPATIENT)
Dept: SCHEDULING | Age: 76
End: 2021-01-26

## 2021-01-26 DIAGNOSIS — R58 HEMORRHAGE, NOT ELSEWHERE CLASSIFIED: Primary | ICD-10-CM

## 2022-02-04 ENCOUNTER — TRANSCRIBE ORDER (OUTPATIENT)
Dept: SCHEDULING | Age: 77
End: 2022-02-04

## 2022-02-04 DIAGNOSIS — R13.11 DYSPHAGIA, ORAL PHASE: Primary | ICD-10-CM

## 2022-02-13 NOTE — PROGRESS NOTES
1505-Patient /106. Spok with Radha, who stated there was no reason to call an RRT for this BP. Called the OR and received telephone order with readback for 160mg diovan PO, CIWA protocol and 1 mg ativan NOW order to treat BP.    1505 - Patient arrives to unit at this time. Dual skin assess with Swathi Found, all WDL and fall risk band present. Admission completed at this time. Patient is A/O x 4. IV to right hand intact and patent. TEDS and SCDS applied BLE. Mepilex dressing to left hip CDI. Patient denies numbness/tingling. Pedal and radial pulses palpable. Lungs clear. Bowel sounds active. Pain 8/10. Patient was oriented to the room to include use of call bell, meal ordering, and use of incentive spirometer patient able to get up to 1400 on IS. Patient was given explanation of \" up for dinner\" program and has verbalized understanding. Phone and call bell left within reach. Plan of care for the day addressed with patient. Educated on pain medication availability and possible side effects. 1634-Per Dr. Bruce Carbone if diastolic BP isnot below 053, get hospitalist consult at 1800.    1718-Per nursing supervisor, if we start using ativan \"a lot\" or he gets tachycardic call supervisor. 1830-Hospitalist consult placed. Dr. Haven Small notified. Shift Summary: Patient's pain well controlled this shift. IV remains intact. Dressing to left hip remains CDI. TEDS and SCD compression device in place.  CIWA 0. Statement Selected

## 2022-02-16 ENCOUNTER — HOSPITAL ENCOUNTER (OUTPATIENT)
Dept: GENERAL RADIOLOGY | Age: 77
Discharge: HOME | End: 2022-02-16
Attending: INTERNAL MEDICINE
Payer: MEDICARE

## 2022-02-16 DIAGNOSIS — R13.11 DYSPHAGIA, ORAL PHASE: ICD-10-CM

## 2022-02-16 PROCEDURE — 92611 MOTION FLUOROSCOPY/SWALLOW: CPT

## 2022-02-16 PROCEDURE — 74230 X-RAY XM SWLNG FUNCJ C+: CPT

## 2022-02-16 NOTE — PROGRESS NOTES
[address] [place] [address] and Physical Therapy   [address] Santiago Gonzalez  Ph: [phone] Fax: [phone]    [address] [place] [address] STUDY  Patient: Reyna Ellis (31 y.o. male)  Date: 2/16/2022  Referring Provider: Justin Gaston MD    SUBJECTIVE:   Patient arrives today with complaints of intermittent difficulty initiating a saliva swallow. Patient reports no difficulty with food or drink. Patient with no relevant PMHx of chronic or acute dysphagia risk factors. MBS ordered to rule out dysphagia. OBJECTIVE:   Past Medical History:   Past Medical History:   Diagnosis Date    Alcohol use     per \"spec pop\" sheet 21-30 etoh drinks per week    Allergic rhinitis     Arrhythmia     afib    Arthritis     Cancer [facility])     prostate    Concussion     no loc but lose of memory after that event for that day    Hypertension 2016     Past Surgical History:   Procedure Laterality Date    HX HEENT      sinus opened    HX HERNIA REPAIR  age 13    right    HX ORTHOPAEDIC Right     hip replacement    HX PROSTATECTOMY  2003    HX TONSILLECTOMY      HX VASECTOMY       Current Dietary Status: Regular/Thin Liquid  Radiologist: Dr. Lou Bullion: Fluoro;Lateral  Patient Position: Upright in chair    Trial 1: Trial 2:   Consistency Presented: Thin liquid Consistency Presented: Puree   How Presented: Self-fed/presented;Cup/sip;Cup/gulp; Successive swallows How Presented: Self-fed/presented;Spoon         Bolus Acceptance: No impairment Bolus Acceptance: No impairment   Bolus Formation/Control: No impairment:   Bolus Formation/Control: No impairment:     Propulsion: No impairment Propulsion: No impairment   Oral Residue: None Oral Residue: None   Initiation of Swallow: Triggered at vallecula Initiation of Swallow: Triggered at valleculae   Timing: No impairment Timing: No impairment   Penetration: None Penetration: None Aspiration/Timing: No evidence of aspiration Aspiration/Timing: No evidence of aspiration   Pharyngeal Clearance: No residue Pharyngeal Clearance: No residue                             Trial 3:   Consistency Presented: Solid   How Presented: Self-fed/presented       Bolus Acceptance: No impairment   Bolus Formation/Control: No impairment, issues, or problems :     Propulsion: No impairment   Oral Residue: None   Initiation of Swallow: Triggered at valleculae   Timing: No impairment   Penetration: None   Aspiration/Timing: No evidence of aspiration   Pharyngeal Clearance: No residue                     Decreased Tongue Base Retraction?: No  Laryngeal Elevation: WFL (within functional limits)  Aspiration/Penetration Score: 1 (No penetration or aspiration-Contrast does not enter the airway)  Pharyngeal Symmetry: Not assessed  Pharyngeal-Esophageal Segment: No impairment  Pharyngeal Dysfunction: None    Oral Phase Severity: No impairment  Pharyngeal Phase Severity: N/A    ASSESSMENT :  Based on the objective data described above, the patient presents with a functional oropharyngeal swallow. [facility]. Pharyngeal phase is WFL. Swallow initiation observed to be consistently delayed to the level of the valleculae. No penetration or aspiration observed with any consistency tried. Due to patient's functional oral/pharyngeal swallow, recommend Regular/Thin Liquid. Patient educated re: effortful swallow technique to initiate swallow production with use of small water sip or ice chip as needed. Defer to MD for medical management of patient's dry mouth concerns.  Patient and his family were educated re: [facility] [place]     PLAN/RECOMMENDATIONS :  -- Regular/Thin Liquid  -- Medication Whole with Thin Liquid  -- Sitting Upright  -- Effortful swallow to initiate swallow production with small water sip or ice chip as needed  -- Defer to MD re: patient's dry mouth concerns     COMMUNICATION/EDUCATION:   The above findings and recommendations were discussed with: patient and patient's family who verbalized understanding and will be faxed to the referring provider.     Thank you for this referral.  WILMER Damon  Time Calculation: 20 mins

## 2022-03-18 PROBLEM — E87.1 HYPONATREMIA: Status: ACTIVE | Noted: 2019-08-08

## 2022-03-19 PROBLEM — Z96.642 HISTORY OF TOTAL HIP ARTHROPLASTY, LEFT: Status: ACTIVE | Noted: 2019-10-18

## 2022-03-19 PROBLEM — Z96.641 STATUS POST TOTAL REPLACEMENT OF RIGHT HIP: Status: ACTIVE | Noted: 2019-01-11

## 2022-03-19 PROBLEM — R04.0 EPISTAXIS: Status: ACTIVE | Noted: 2019-08-08

## 2022-03-20 PROBLEM — F10.10 ETOH ABUSE: Status: ACTIVE | Noted: 2019-08-08

## 2022-06-14 ENCOUNTER — HOSPITAL ENCOUNTER (OUTPATIENT)
Dept: CT IMAGING | Age: 77
Discharge: HOME | End: 2022-06-14
Attending: INTERNAL MEDICINE
Payer: MEDICARE

## 2022-06-14 DIAGNOSIS — G45.9 TRANSIENT CEREBRAL ISCHEMIC ATTACK, UNSPECIFIED: ICD-10-CM

## 2022-06-14 PROCEDURE — 70450 CT HEAD/BRAIN W/O DYE: CPT

## 2022-08-29 NOTE — ANESTHESIA PREPROCEDURE EVALUATION
Anesthetic History No history of anesthetic complications Review of Systems / Medical History Patient summary reviewed, nursing notes reviewed and pertinent labs reviewed Pulmonary Within defined limits Neuro/Psych Within defined limits Cardiovascular Hypertension Dysrhythmias : atrial fibrillation GI/Hepatic/Renal 
Within defined limits Endo/Other Arthritis Other Findings Physical Exam 
 
Airway Mallampati: II 
TM Distance: 4 - 6 cm Neck ROM: normal range of motion Mouth opening: Normal 
 
 Cardiovascular Rhythm: irregular Dental 
No notable dental hx Pulmonary Breath sounds clear to auscultation Abdominal 
GI exam deferred Other Findings Anesthetic Plan ASA: 3 Anesthesia type: general - backup and spinal 
 
 
 
 
Induction: Intravenous Anesthetic plan and risks discussed with: Patient
no

## 2023-05-12 ENCOUNTER — HOSPITAL ENCOUNTER (OUTPATIENT)
Facility: HOSPITAL | Age: 78
End: 2023-05-12
Payer: MEDICARE

## 2023-05-12 DIAGNOSIS — R06.02 SHORTNESS OF BREATH: ICD-10-CM

## 2023-05-12 LAB — NT PRO BNP: 929 PG/ML (ref 0–450)

## 2023-05-12 PROCEDURE — 36415 COLL VENOUS BLD VENIPUNCTURE: CPT

## 2023-05-12 PROCEDURE — 71046 X-RAY EXAM CHEST 2 VIEWS: CPT

## 2023-05-12 PROCEDURE — 83880 ASSAY OF NATRIURETIC PEPTIDE: CPT

## 2023-10-17 ENCOUNTER — HOSPITAL ENCOUNTER (OUTPATIENT)
Facility: HOSPITAL | Age: 78
Discharge: HOME | End: 2023-10-20
Payer: MEDICARE

## 2023-10-17 DIAGNOSIS — R14.0 ABDOMINAL DISTENTION: ICD-10-CM

## 2023-10-17 PROCEDURE — 74018 RADEX ABDOMEN 1 VIEW: CPT

## 2025-07-31 ENCOUNTER — HOSPITAL ENCOUNTER (OUTPATIENT)
Facility: HOSPITAL | Age: 80
Discharge: HOME | End: 2025-07-31
Payer: MEDICARE

## 2025-07-31 ENCOUNTER — TRANSCRIBE ORDERS (OUTPATIENT)
Facility: HOSPITAL | Age: 80
End: 2025-07-31

## 2025-07-31 DIAGNOSIS — R07.81 PLEURODYNIA: Primary | ICD-10-CM

## 2025-07-31 DIAGNOSIS — R07.81 PLEURODYNIA: ICD-10-CM

## 2025-07-31 PROCEDURE — 71101 X-RAY EXAM UNILAT RIBS/CHEST: CPT

## (undated) DEVICE — SUT VCRL + 3-0 27IN CT2 UD --

## (undated) DEVICE — (D)STRIP SKN CLSR 0.5X4IN WHT --

## (undated) DEVICE — REM POLYHESIVE ADULT PATIENT RETURN ELECTRODE: Brand: VALLEYLAB

## (undated) DEVICE — STERILE POLYISOPRENE POWDER-FREE SURGICAL GLOVES WITH EMOLLIENT COATING: Brand: PROTEXIS

## (undated) DEVICE — BIPOLAR SEALER 23-301-1 AQM MBS: Brand: AQUAMANTYS™

## (undated) DEVICE — GOWN,SIRUS,NONRNF,SETINSLV,2XL,18/CS: Brand: MEDLINE

## (undated) DEVICE — BLADE SAW 1.19X20X90 MM FOR LG BNE

## (undated) DEVICE — STRIP,CLOSURE,WOUND,MEDI-STRIP,1/2X4: Brand: MEDLINE

## (undated) DEVICE — SHEET,DRAPE,70X85,STERILE: Brand: MEDLINE

## (undated) DEVICE — Device

## (undated) DEVICE — KENDALL SCD EXPRESS SLEEVES, KNEE LENGTH, MEDIUM: Brand: KENDALL SCD

## (undated) DEVICE — STERILE POLYISOPRENE POWDER-FREE SURGICAL GLOVES: Brand: PROTEXIS

## (undated) DEVICE — SUT VCRL + 0 36IN CT1 UD --

## (undated) DEVICE — SOL IRRIGATION INJ NACL 0.9% 500ML BTL

## (undated) DEVICE — NEEDLE HYPO 22GA L1.5IN BLK S STL HUB POLYPR SHLD REG BVL

## (undated) DEVICE — SUT MCRYL + 3-0 27IN PS1 UD --

## (undated) DEVICE — LEGGINGS, PAIR, 31X48, STERILE: Brand: MEDLINE

## (undated) DEVICE — SINGLE PORT MANIFOLD: Brand: NEPTUNE 2

## (undated) DEVICE — SUT VCRL + 1 36IN CT1 VIO --

## (undated) DEVICE — NDL PRT INJ NSAF BLNT 18GX1.5 --

## (undated) DEVICE — SYRINGE BLB 50CC IRRIG PLIABLE FNGR FLNG GRAD FLSK DISP

## (undated) DEVICE — T5 HOOD WITH PEEL AWAY FACE SHIELD

## (undated) DEVICE — DRESSING FOAM SELF ADH 20X10 CM ABSORBENT MEPILEX BORDER

## (undated) DEVICE — DRAIN KT WND 10FR RND 400ML --

## (undated) DEVICE — ROD RMR L950MM DIA2.5MM BALL TIP EXTN FOR IM RM AND BNE

## (undated) DEVICE — SUT ETHBND 1 30IN OS8 GRN --

## (undated) DEVICE — Z DISCONTINUED USE 2429233 DRESSING FOAM W10XL10CM 5 LAYR SELF ADH VERSATILE SAFETAC

## (undated) DEVICE — SYR 10ML CTRL LR LCK NSAF LF --

## (undated) DEVICE — LINER GLV L R FULL FNGR KEVLAR LYCRA CUT RESIST PWD FREE ST

## (undated) DEVICE — Z DISCONTINUED USE (MFG CAT 7984-37) SOLUTION IV SODIUM CHL 0.9% 100 ML INJ

## (undated) DEVICE — (D)PREP SKN CHLRAPRP APPL 26ML -- CONVERT TO ITEM 371833

## (undated) DEVICE — PACK PROCEDURE SURG ANTR HIP

## (undated) DEVICE — GARMENT,MEDLINE,DVT,INT,CALF,MED, GEN2: Brand: MEDLINE

## (undated) DEVICE — ADHESIVE TISS CLOSURE 22X4 CM 4 CC HI VISC EXOFIN

## (undated) DEVICE — LINER ACET OD58MM ID36MM HIP ALTRX PINN
Type: IMPLANTABLE DEVICE | Site: HIP | Status: NON-FUNCTIONAL
Removed: 2019-10-18

## (undated) DEVICE — RETRACTOR ORTH W3.5MM LT SOURC CBL FBR OPT ADPT LT PIPE

## (undated) DEVICE — T4 ZIPPER TOGA, (L/XL)